# Patient Record
Sex: MALE | Race: WHITE | NOT HISPANIC OR LATINO | Employment: FULL TIME | URBAN - METROPOLITAN AREA
[De-identification: names, ages, dates, MRNs, and addresses within clinical notes are randomized per-mention and may not be internally consistent; named-entity substitution may affect disease eponyms.]

---

## 2017-09-14 ENCOUNTER — OFFICE VISIT (OUTPATIENT)
Dept: MEDICAL GROUP | Facility: MEDICAL CENTER | Age: 27
End: 2017-09-14
Payer: COMMERCIAL

## 2017-09-14 VITALS
DIASTOLIC BLOOD PRESSURE: 64 MMHG | OXYGEN SATURATION: 95 % | WEIGHT: 230.2 LBS | TEMPERATURE: 99.3 F | BODY MASS INDEX: 29.54 KG/M2 | SYSTOLIC BLOOD PRESSURE: 112 MMHG | HEART RATE: 92 BPM | RESPIRATION RATE: 16 BRPM | HEIGHT: 74 IN

## 2017-09-14 DIAGNOSIS — F33.0 MILD EPISODE OF RECURRENT MAJOR DEPRESSIVE DISORDER (HCC): ICD-10-CM

## 2017-09-14 DIAGNOSIS — G47.30 SLEEP APNEA, UNSPECIFIED TYPE: ICD-10-CM

## 2017-09-14 DIAGNOSIS — J06.9 VIRAL UPPER RESPIRATORY TRACT INFECTION: ICD-10-CM

## 2017-09-14 DIAGNOSIS — Z00.00 WELLNESS EXAMINATION: ICD-10-CM

## 2017-09-14 DIAGNOSIS — Z13.6 SCREENING FOR CARDIOVASCULAR CONDITION: ICD-10-CM

## 2017-09-14 PROCEDURE — 99204 OFFICE O/P NEW MOD 45 MIN: CPT | Performed by: PHYSICIAN ASSISTANT

## 2017-09-14 RX ORDER — SERTRALINE HYDROCHLORIDE 25 MG/1
25 TABLET, FILM COATED ORAL DAILY
Qty: 30 TAB | Refills: 3 | Status: SHIPPED | OUTPATIENT
Start: 2017-09-14 | End: 2017-09-29

## 2017-09-14 RX ORDER — IBUPROFEN 200 MG
200 TABLET ORAL EVERY 6 HOURS PRN
COMMUNITY
End: 2018-04-27

## 2017-09-14 RX ORDER — LORATADINE 10 MG/1
10 TABLET ORAL DAILY
COMMUNITY
End: 2018-04-27

## 2017-09-14 ASSESSMENT — PATIENT HEALTH QUESTIONNAIRE - PHQ9
CLINICAL INTERPRETATION OF PHQ2 SCORE: 3
5. POOR APPETITE OR OVEREATING: 2 - MORE THAN HALF THE DAYS
SUM OF ALL RESPONSES TO PHQ QUESTIONS 1-9: 10

## 2017-09-14 NOTE — PATIENT INSTRUCTIONS
Depression, Adult  Depression refers to feeling sad, low, down in the dumps, blue, gloomy, or empty. In general, there are two kinds of depression:  1. Normal sadness or normal grief. This kind of depression is one that we all feel from time to time after upsetting life experiences, such as the loss of a job or the ending of a relationship. This kind of depression is considered normal, is short lived, and resolves within a few days to 2 weeks. Depression experienced after the loss of a loved one (bereavement) often lasts longer than 2 weeks but normally gets better with time.  2. Clinical depression. This kind of depression lasts longer than normal sadness or normal grief or interferes with your ability to function at home, at work, and in school. It also interferes with your personal relationships. It affects almost every aspect of your life. Clinical depression is an illness.  Symptoms of depression can also be caused by conditions other than those mentioned above, such as:  · Physical illness. Some physical illnesses, including underactive thyroid gland (hypothyroidism), severe anemia, specific types of cancer, diabetes, uncontrolled seizures, heart and lung problems, strokes, and chronic pain are commonly associated with symptoms of depression.  · Side effects of some prescription medicine. In some people, certain types of medicine can cause symptoms of depression.  · Substance abuse. Abuse of alcohol and illicit drugs can cause symptoms of depression.  SYMPTOMS  Symptoms of normal sadness and normal grief include the following:  · Feeling sad or crying for short periods of time.  · Not caring about anything (apathy).  · Difficulty sleeping or sleeping too much.  · No longer able to enjoy the things you used to enjoy.  · Desire to be by oneself all the time (social isolation).  · Lack of energy or motivation.  · Difficulty concentrating or remembering.  · Change in appetite or weight.  · Restlessness or  agitation.  Symptoms of clinical depression include the same symptoms of normal sadness or normal grief and also the following symptoms:  · Feeling sad or crying all the time.  · Feelings of guilt or worthlessness.  · Feelings of hopelessness or helplessness.  · Thoughts of suicide or the desire to harm yourself (suicidal ideation).  · Loss of touch with reality (psychotic symptoms). Seeing or hearing things that are not real (hallucinations) or having false beliefs about your life or the people around you (delusions and paranoia).  DIAGNOSIS   The diagnosis of clinical depression is usually based on how bad the symptoms are and how long they have lasted. Your health care provider will also ask you questions about your medical history and substance use to find out if physical illness, use of prescription medicine, or substance abuse is causing your depression. Your health care provider may also order blood tests.  TREATMENT   Often, normal sadness and normal grief do not require treatment. However, sometimes antidepressant medicine is given for bereavement to ease the depressive symptoms until they resolve.  The treatment for clinical depression depends on how bad the symptoms are but often includes antidepressant medicine, counseling with a mental health professional, or both. Your health care provider will help to determine what treatment is best for you.  Depression caused by physical illness usually goes away with appropriate medical treatment of the illness. If prescription medicine is causing depression, talk with your health care provider about stopping the medicine, decreasing the dose, or changing to another medicine.  Depression caused by the abuse of alcohol or illicit drugs goes away when you stop using these substances. Some adults need professional help in order to stop drinking or using drugs.  SEEK IMMEDIATE MEDICAL CARE IF:  · You have thoughts about hurting yourself or others.  · You lose touch  with reality (have psychotic symptoms).  · You are taking medicine for depression and have a serious side effect.  FOR MORE INFORMATION  · National [place] on Mental Illness: www.roberto.org   · National [place] of Mental Health: www.nimh.nih.gov      This information is not intended to replace advice given to you by your health care provider. Make sure you discuss any questions you have with your health care provider.     Document Released: 12/15/2001 Document Revised: 01/08/2016 Document Reviewed: 03/18/2013  [employer] Interactive Patient Education ©2016 [employer].  Sertraline tablets  What is this medicine?  SERTRALINE (SER tra blas) is used to treat depression. It may also be used to treat obsessive compulsive disorder, panic disorder, post-trauma stress, premenstrual dysphoric disorder (PMDD) or social anxiety.  This medicine may be used for other purposes; ask your health care provider or pharmacist if you have questions.  COMMON BRAND NAME(S): Zoloft  What should I tell my health care provider before I take this medicine?  They need to know if you have any of these conditions:  -bipolar disorder or a family history of bipolar disorder  -diabetes  -heart disease  -liver disease  -receiving electroconvulsive therapy  -seizures (convulsions)  -suicidal thoughts, plans, or attempt by you or a family member  -an unusual or allergic reaction to sertraline, other medicines, foods, dyes, or preservatives  -pregnant or trying to get pregnant  -breast-feeding  How should I use this medicine?  Take this medicine by mouth with a glass of water. Follow the directions on the prescription label. You can take it with or without food. Take your medicine at regular intervals. Do not take your medicine more often than directed. Do not stop taking this medicine suddenly except upon the advice of your doctor. Stopping this medicine too quickly may cause serious side effects or your condition may worsen.  A special MedGuide will  be given to you by the pharmacist with each prescription and refill. Be sure to read this information carefully each time.  Talk to your pediatrician regarding the use of this medicine in children. While this drug may be prescribed for children as young as 7 years for selected conditions, precautions do apply.  Overdosage: If you think you have taken too much of this medicine contact a poison control center or emergency room at once.  NOTE: This medicine is only for you. Do not share this medicine with others.  What if I miss a dose?  If you miss a dose, take it as soon as you can. If it is almost time for your next dose, take only that dose. Do not take double or extra doses.  What may interact with this medicine?  Do not take this medicine with any of the following medications:  -linezolid  -MAOIs like Carbex, Eldepryl, Marplan, Nardil, and Parnate  -methylene blue (injected into a vein)  -pimozide  -thioridazine  This medicine may also interact with the following medications:  -alcohol  -aspirin and aspirin-like medicines  -certain medicines for depression, anxiety, or psychotic disturbances  -certain medicines for migraine headaches like almotriptan, eletriptan, frovatriptan, naratriptan, rizatriptan, sumatriptan, zolmitriptan  -certain medicines for seizures like carbamazepine, valproic acid, phenytoin  -cimetidine  -digoxin  -diuretics  -fentanyl  -furazolidone  -isoniazid  -lithium  -medicines that treat or prevent blood clots like warfarin, enoxaparin, and dalteparin  -medicines to control heart rhythm like flecainide or propafenone  -medicines for sleep  -NSAIDs, medicines for pain and inflammation, like ibuprofen or naproxen  -procarbazine  -rasagiline  -supplements like Khadijah's wort, kava kava, valerian  -tolbutamide  -tramadol  -tryptophan  This list may not describe all possible interactions. Give your health care provider a list of all the medicines, herbs, non-prescription drugs, or dietary  supplements you use. Also tell them if you smoke, drink alcohol, or use illegal drugs. Some items may interact with your medicine.  What should I watch for while using this medicine?  Tell your doctor if your symptoms do not get better or if they get worse. Visit your doctor or health care professional for regular checks on your progress. Because it may take several weeks to see the full effects of this medicine, it is important to continue your treatment as prescribed by your doctor.  Patients and their families should watch out for new or worsening thoughts of suicide or depression. Also watch out for sudden changes in feelings such as feeling anxious, agitated, panicky, irritable, hostile, aggressive, impulsive, severely restless, overly excited and hyperactive, or not being able to sleep. If this happens, especially at the beginning of treatment or after a change in dose, call your health care professional.  You may get drowsy or dizzy. Do not drive, use machinery, or do anything that needs mental alertness until you know how this medicine affects you. Do not stand or sit up quickly, especially if you are an older patient. This reduces the risk of dizzy or fainting spells. Alcohol may interfere with the effect of this medicine. Avoid alcoholic drinks.  Your mouth may get dry. Chewing sugarless gum or sucking hard candy, and drinking plenty of water may help. Contact your doctor if the problem does not go away or is severe.  What side effects may I notice from receiving this medicine?  Side effects that you should report to your doctor or health care professional as soon as possible:  -allergic reactions like skin rash, itching or hives, swelling of the face, lips, or tongue  -black or bloody stools, blood in the urine or vomit  -fast, irregular heartbeat  -feeling faint or lightheaded, falls  -hallucination, loss of contact with reality  -seizures  -suicidal thoughts or other mood changes  -unusual bleeding or  bruising  -unusually weak or tired  -vomiting  Side effects that usually do not require medical attention (report to your doctor or health care professional if they continue or are bothersome):  -change in appetite  -change in sex drive or performance  -diarrhea  -increased sweating  -indigestion, nausea  -tremors  This list may not describe all possible side effects. Call your doctor for medical advice about side effects. You may report side effects to FDA at 4-769-TOH-6017.  Where should I keep my medicine?  Keep out of the reach of children.  Store at room temperature between 15 and 30 degrees C (59 and 86 degrees F). Throw away any unused medicine after the expiration date.  NOTE: This sheet is a summary. It may not cover all possible information. If you have questions about this medicine, talk to your doctor, pharmacist, or health care provider.  © 2014, Elsevier/[employer]. (5/3/2013 8:59:59 PM)

## 2017-09-14 NOTE — ASSESSMENT & PLAN NOTE
Feels like he has been depressed for the past 5 years. Notices feelings of being down, depressed, anxious, not happy with work. Interested in trying medication.

## 2017-09-14 NOTE — LETTER
September 14, 2017        Rene Henry    To Whom it May Concern:  Patient, Rene, was seen in our clinic today to discuss multiple medical matters. Please excuse from work today so he may take and adjust to medication.     If you have any questions, feel free to reach out.  [phone]                        Cris Velarde PA-C  Electronically signed

## 2017-09-14 NOTE — ASSESSMENT & PLAN NOTE
"Started about 2 weeks ago. Woke up with fever/chills. Went to work, cough, sore throat fever. Went home and rested. 3rd day went to urgent care. Fever was better. They did exam and gave rx for antibiotic, cough medication but he didn't take them. Since then still coughing, brown specks in phlegm. When this happened previously he though it was from smoking but now not smoking anymore so not sure why the brown mucus is coming. Getting intermittent left frontal headaches. Feeling congestion in nose. Having some sinus pain above eyes.getting some chest pain, not necessarily associated with cough. Also now having \"pastel\" colored stools every few bowel movements. 3-4 weeks ago took a vitamin D supplement. Recently over the past month trying to eat healthier.  "

## 2017-09-14 NOTE — PROGRESS NOTES
"Chief Complaint   Patient presents with   • Establish Care   • Cold Symptoms     x 2 weeks, brown phlegm, headache, sore throat, fever, stomach ache/diahrrea, congestion   • Chest Pain     tight pain in middle of chest, random       HISTORY OF THE PRESENT ILLNESS: This is a 26 y.o. male new patient to our practice. This pleasant patient is here today to discuss recent upper respiratory symptoms, depression, sleep apnea    Viral upper respiratory tract infection  Started about 2 weeks ago. Woke up with fever/chills. Went to work, cough, sore throat fever. Went home and rested. 3rd day went to urgent care. Fever was better. They did exam and gave rx for antibiotic, cough medication but he didn't take them. Since then still coughing, brown specks in phlegm. When this happened previously he though it was from smoking but now not smoking anymore so not sure why the brown mucus is coming. Getting intermittent left frontal headaches. Feeling congestion in nose. Having some sinus pain above eyes.getting some chest pain, not necessarily associated with cough. Also now having \"pastel\" colored stools every few bowel movements. 3-4 weeks ago took a vitamin D supplement. Recently over the past month trying to eat healthier.    Mild episode of recurrent major depressive disorder (CMS-HCC)  Feels like he has been depressed for the past 5 years. Notices feelings of being down, depressed, anxious, not happy with work. Interested in trying medication.     Sleep apnea  For the past 5 years wife has noticed he stops breathing at night for a few seconds then will gasp and then start breathing normally again. Dad has sleep apnea. Patient notices he gets very tired daily starting about 11am. Would like to be checked for sleep apnea    Depression Screening    Little interest or pleasure in doing things?  1 - several days  Feeling down, depressed , or hopeless? 2 - more than half the days  Trouble falling or staying asleep, or sleeping too " much?  2 - more than half the days  Feeling tired or having little energy?  2 - more than half the days  Poor appetite or overeating?  2 - more than half the days  Feeling bad about yourself - or that you are a failure or have let yourself or your family down? 1 - several days  Trouble concentrating on things, such as reading the newspaper or watching television? 0 - not at all  Moving or speaking so slowly that other people could have noticed.  Or the opposite - being so fidgety or restless that you have been moving around a lot more than usual?  0 - not at all  Thoughts that you would be better off dead, or of hurting yourself?  0 - not at all  Patient Health Questionnaire Score: 10      If depressive symptoms identified deferred to follow up visit unless specifically addressed in assesment and plan.    Interpretation of PHQ-9 Total Score   Score Severity   1-4 No Depression   5-9 Mild Depression   10-14 Moderate Depression   15-19 Moderately Severe Depression   20-27 Severe Depression  Past Medical History:   Diagnosis Date   • Acne        No past surgical history on file.    Family Status   Relation Status   • Mother Alive   • Father Alive     Family History   Problem Relation Age of Onset   • Blood Clots Mother    • Hyperlipidemia Father    • Diabetes Father        Social History   Substance Use Topics   • Smoking status: Former Smoker     Types: Cigarettes     Quit date: 8/24/2017   • Smokeless tobacco: Never Used      Comment: 1-2 per week/quit recently   • Alcohol use 1.2 oz/week     2 Cans of beer per week       Allergies: Review of patient's allergies indicates no known allergies.    Current Outpatient Prescriptions Ordered in [facility]   Medication Sig Dispense Refill   • ibuprofen (MOTRIN) 200 MG Tab Take 200 mg by mouth every 6 hours as needed.     • loratadine (CLARITIN) 10 MG Tab Take 10 mg by mouth every day.     • sertraline (ZOLOFT) 25 MG tablet Take 1 Tab by mouth every day. 30 Tab 3     No current  "Epic-ordered facility-administered medications on file.        Review of Systems   Constitutional: Negative for fever, chills, weight loss      Eyes: Negative for blurred vision.   Respiratory: Negative for  shortness of breath and wheezing.    Cardiovascular: Negative for  palpitations, orthopnea and leg swelling.   Gastrointestinal: Negative for heartburn, nausea, vomiting and abdominal pain.   Genitourinary: Negative for dysuria, urgency and frequency.   Musculoskeletal: Negative for myalgias, back pain and joint pain.   Skin: Negative for rash and itching.   Neurological: Negative for dizziness, tingling, tremors, sensory change, focal weakness  Endo/Heme/Allergies: Does not bruise/bleed easily.   All other systems reviewed and are negative except as in HPI.    Exam: Blood pressure 112/64, pulse 92, temperature 37.4 °C (99.3 °F), resp. rate 16, height 1.88 m (6' 2\"), weight 104.4 kg (230 lb 3.2 oz), SpO2 95 %.  General: Normal appearing. No distress.  HEENT: Normocephalic. Eyes conjunctiva clear lids without ptosis, pupils equal and reactive to light accommodation, ears normal shape and contour, canals are clear bilaterally, tympanic membranes are benign, nasal mucosa benign, oropharynx is without erythema, edema or exudates.   Neck: Supple without JVD or bruit. Thyroid is not enlarged.  Pulmonary: Clear to ausculation.  Normal effort. No rales, ronchi, or wheezing.  Cardiovascular: Regular rate and rhythm without murmur. Carotid and radial pulses are intact and equal bilaterally.  Neurologic: Grossly nonfocal  Lymph: No cervical, supraclavicular  lymph nodes are palpable  Skin: Warm and dry.  No obvious lesions.  Musculoskeletal: Normal gait. No extremity cyanosis, clubbing, or edema.  Psych: Normal mood and affect. Alert and oriented x3. Judgment and insight is normal.      Assessment/Plan  1. Viral upper respiratory tract infection     2. Sleep apnea, unspecified type  REFERRAL TO SLEEP STUDIES   3. Mild " episode of recurrent major depressive disorder (CMS-HCC)  sertraline (ZOLOFT) 25 MG tablet    Patient has been identified as being depressed and appropriate orders and counseling have been given   4. Wellness examination  CBC WITH DIFFERENTIAL    COMP METABOLIC PANEL    TSH WITH REFLEX TO FT4   5. Screening for cardiovascular condition  LIPID PROFILE     1. Viral URI. Discussed smoking cessation, supportive and otc meds. If symptoms persist consider CXR  2. Possible sleep apnea, referred to sleep studies  3. Depression. Discussed risk/benefit and side effect profile for zoloft. F/u 2 weeks  Labs ordered  Will get flu shot at f/u in 2 weeks

## 2017-09-24 ENCOUNTER — PATIENT MESSAGE (OUTPATIENT)
Dept: MEDICAL GROUP | Facility: MEDICAL CENTER | Age: 27
End: 2017-09-24

## 2017-09-24 DIAGNOSIS — R05.9 COUGH: ICD-10-CM

## 2017-09-26 ENCOUNTER — HOSPITAL ENCOUNTER (OUTPATIENT)
Dept: LAB | Facility: MEDICAL CENTER | Age: 27
End: 2017-09-26
Attending: PHYSICIAN ASSISTANT
Payer: COMMERCIAL

## 2017-09-26 DIAGNOSIS — Z00.00 WELLNESS EXAMINATION: ICD-10-CM

## 2017-09-26 DIAGNOSIS — Z13.6 SCREENING FOR CARDIOVASCULAR CONDITION: ICD-10-CM

## 2017-09-26 LAB
ALBUMIN SERPL BCP-MCNC: 4.4 G/DL (ref 3.2–4.9)
ALBUMIN/GLOB SERPL: 1.8 G/DL
ALP SERPL-CCNC: 65 U/L (ref 30–99)
ALT SERPL-CCNC: 28 U/L (ref 2–50)
ANION GAP SERPL CALC-SCNC: 6 MMOL/L (ref 0–11.9)
AST SERPL-CCNC: 22 U/L (ref 12–45)
BASOPHILS # BLD AUTO: 0.8 % (ref 0–1.8)
BASOPHILS # BLD: 0.06 K/UL (ref 0–0.12)
BILIRUB SERPL-MCNC: 0.4 MG/DL (ref 0.1–1.5)
BUN SERPL-MCNC: 12 MG/DL (ref 8–22)
CALCIUM SERPL-MCNC: 9.7 MG/DL (ref 8.5–10.5)
CHLORIDE SERPL-SCNC: 105 MMOL/L (ref 96–112)
CHOLEST SERPL-MCNC: 178 MG/DL (ref 100–199)
CO2 SERPL-SCNC: 28 MMOL/L (ref 20–33)
CREAT SERPL-MCNC: 0.85 MG/DL (ref 0.5–1.4)
EOSINOPHIL # BLD AUTO: 0.04 K/UL (ref 0–0.51)
EOSINOPHIL NFR BLD: 0.5 % (ref 0–6.9)
ERYTHROCYTE [DISTWIDTH] IN BLOOD BY AUTOMATED COUNT: 41.1 FL (ref 35.9–50)
GFR SERPL CREATININE-BSD FRML MDRD: >60 ML/MIN/1.73 M 2
GLOBULIN SER CALC-MCNC: 2.5 G/DL (ref 1.9–3.5)
GLUCOSE SERPL-MCNC: 88 MG/DL (ref 65–99)
HCT VFR BLD AUTO: 43.5 % (ref 42–52)
HDLC SERPL-MCNC: 40 MG/DL
HGB BLD-MCNC: 14.7 G/DL (ref 14–18)
IMM GRANULOCYTES # BLD AUTO: 0.01 K/UL (ref 0–0.11)
IMM GRANULOCYTES NFR BLD AUTO: 0.1 % (ref 0–0.9)
LDLC SERPL CALC-MCNC: 108 MG/DL
LYMPHOCYTES # BLD AUTO: 2.6 K/UL (ref 1–4.8)
LYMPHOCYTES NFR BLD: 34.5 % (ref 22–41)
MCH RBC QN AUTO: 31.7 PG (ref 27–33)
MCHC RBC AUTO-ENTMCNC: 33.8 G/DL (ref 33.7–35.3)
MCV RBC AUTO: 94 FL (ref 81.4–97.8)
MONOCYTES # BLD AUTO: 0.4 K/UL (ref 0–0.85)
MONOCYTES NFR BLD AUTO: 5.3 % (ref 0–13.4)
NEUTROPHILS # BLD AUTO: 4.42 K/UL (ref 1.82–7.42)
NEUTROPHILS NFR BLD: 58.8 % (ref 44–72)
NRBC # BLD AUTO: 0 K/UL
NRBC BLD AUTO-RTO: 0 /100 WBC
PLATELET # BLD AUTO: 189 K/UL (ref 164–446)
PMV BLD AUTO: 10.9 FL (ref 9–12.9)
POTASSIUM SERPL-SCNC: 3.9 MMOL/L (ref 3.6–5.5)
PROT SERPL-MCNC: 6.9 G/DL (ref 6–8.2)
RBC # BLD AUTO: 4.63 M/UL (ref 4.7–6.1)
SODIUM SERPL-SCNC: 139 MMOL/L (ref 135–145)
TRIGL SERPL-MCNC: 150 MG/DL (ref 0–149)
TSH SERPL DL<=0.005 MIU/L-ACNC: 3.42 UIU/ML (ref 0.3–3.7)
WBC # BLD AUTO: 7.5 K/UL (ref 4.8–10.8)

## 2017-09-26 PROCEDURE — 80053 COMPREHEN METABOLIC PANEL: CPT

## 2017-09-26 PROCEDURE — 84443 ASSAY THYROID STIM HORMONE: CPT

## 2017-09-26 PROCEDURE — 80061 LIPID PANEL: CPT

## 2017-09-26 PROCEDURE — 36415 COLL VENOUS BLD VENIPUNCTURE: CPT

## 2017-09-26 PROCEDURE — 85025 COMPLETE CBC W/AUTO DIFF WBC: CPT

## 2017-09-27 ENCOUNTER — HOSPITAL ENCOUNTER (OUTPATIENT)
Dept: RADIOLOGY | Facility: MEDICAL CENTER | Age: 27
End: 2017-09-27
Attending: PHYSICIAN ASSISTANT
Payer: COMMERCIAL

## 2017-09-27 DIAGNOSIS — R05.9 COUGH: ICD-10-CM

## 2017-09-27 PROCEDURE — 71020 DX-CHEST-2 VIEWS: CPT

## 2017-09-29 ENCOUNTER — OFFICE VISIT (OUTPATIENT)
Dept: MEDICAL GROUP | Facility: MEDICAL CENTER | Age: 27
End: 2017-09-29
Payer: COMMERCIAL

## 2017-09-29 VITALS
RESPIRATION RATE: 16 BRPM | TEMPERATURE: 98.6 F | WEIGHT: 229.2 LBS | SYSTOLIC BLOOD PRESSURE: 132 MMHG | HEART RATE: 78 BPM | DIASTOLIC BLOOD PRESSURE: 82 MMHG | OXYGEN SATURATION: 96 % | BODY MASS INDEX: 29.41 KG/M2 | HEIGHT: 74 IN

## 2017-09-29 DIAGNOSIS — F33.0 MILD EPISODE OF RECURRENT MAJOR DEPRESSIVE DISORDER (HCC): ICD-10-CM

## 2017-09-29 DIAGNOSIS — M54.6 THORACIC SPINE PAIN: ICD-10-CM

## 2017-09-29 DIAGNOSIS — J06.9 VIRAL UPPER RESPIRATORY TRACT INFECTION: ICD-10-CM

## 2017-09-29 PROCEDURE — 99214 OFFICE O/P EST MOD 30 MIN: CPT | Performed by: PHYSICIAN ASSISTANT

## 2017-10-02 PROBLEM — M54.6 THORACIC SPINE PAIN: Status: ACTIVE | Noted: 2017-10-02

## 2017-10-02 PROBLEM — J06.9 VIRAL UPPER RESPIRATORY TRACT INFECTION: Status: RESOLVED | Noted: 2017-09-14 | Resolved: 2017-10-02

## 2017-10-03 NOTE — PROGRESS NOTES
"Subjective:   Rene Figueroa is a 27 y.o. male here today for depression, back/spine/chest pain    Thoracic spine pain  New problem. Started around the same time as his recent cold/cough. Cough is improving. Was having some chest pain. CXR normal. CBC normal. No fever. No hemoptysis. Still with some upper back discomfort.    Mild episode of recurrent major depressive disorder (CMS-HCC)  Slight improvement in symptoms, no adverse reaction to zoloft. Willing to increase dose.     CXR, CBC, CMP, TSH reviewed in office with patient    Current medicines (including changes today)  Current Outpatient Prescriptions   Medication Sig Dispense Refill   • sertraline (ZOLOFT) 50 MG Tab Take 1 Tab by mouth every day. 30 Tab 3   • ibuprofen (MOTRIN) 200 MG Tab Take 200 mg by mouth every 6 hours as needed.     • loratadine (CLARITIN) 10 MG Tab Take 10 mg by mouth every day.       No current facility-administered medications for this visit.      He  has a past medical history of Acne.    ROS   No fever/chills. No weight change. No headache/dizziness. No focal weakness. No sore throat, nasal congestion, ear pain. no shortness of breath, difficulty breathing. No n/v/d/c or abdominal pain. No urinary complaint. No rash or skin lesion. No joint pain or swelling.       Objective:     Blood pressure 132/82, pulse 78, temperature 37 °C (98.6 °F), resp. rate 16, height 1.88 m (6' 2\"), weight 104 kg (229 lb 3.2 oz), SpO2 96 %. Body mass index is 29.43 kg/m².   Physical Exam:  Constitutional: WDWN, NAD  Skin: Warm, dry, good turgor, no rashes in visible areas.  Eye: Equal, round and reactive, conjunctiva clear, lids normal.  ENMT: Lips without lesions, good dentition, oropharynx clear.  Neck: Trachea midline, no masses, no thyromegaly. No cervical or supraclavicular lymphadenopathy  Respiratory: Unlabored respiratory effort, lungs clear to auscultation, no wheezes, no ronchi.  Cardiovascular: Normal S1, S2, no murmur, no leg edema.  Thoracic " spine normal to inspection without deformity or tenderness. Paraspinal muscles with spasm, slightly ttp  Psych: Alert and oriented x3, normal affect and mood.        Assessment and Plan:   The following treatment plan was discussed    1. Mild episode of recurrent major depressive disorder (CMS-HCC)  Increase from 25 to 50mg daily  - sertraline (ZOLOFT) 50 MG Tab; Take 1 Tab by mouth every day.  Dispense: 30 Tab; Refill: 3    2. Viral upper respiratory tract infection  Resolving without antibiotic    3. Thoracic spine pain  Likely muscular, PT rec  - DX-THORACIC SPINE-2 VIEWS; Future  - REFERRAL TO PHYSICAL THERAPY Reason for Therapy: Eval/Treat/Report      Followup: Return in about 4 weeks (around 10/27/2017).

## 2017-10-03 NOTE — ASSESSMENT & PLAN NOTE
New problem. Started around the same time as his recent cold/cough. Cough is improving. Was having some chest pain. CXR normal. CBC normal. No fever. No hemoptysis. Still with some upper back discomfort.

## 2017-10-10 ENCOUNTER — HOSPITAL ENCOUNTER (OUTPATIENT)
Dept: RADIOLOGY | Facility: MEDICAL CENTER | Age: 27
End: 2017-10-10
Attending: PHYSICIAN ASSISTANT
Payer: COMMERCIAL

## 2017-10-10 DIAGNOSIS — M54.6 THORACIC SPINE PAIN: ICD-10-CM

## 2017-10-10 PROCEDURE — 72070 X-RAY EXAM THORAC SPINE 2VWS: CPT

## 2017-10-13 ENCOUNTER — PATIENT MESSAGE (OUTPATIENT)
Dept: MEDICAL GROUP | Facility: MEDICAL CENTER | Age: 27
End: 2017-10-13

## 2017-10-13 DIAGNOSIS — M54.6 THORACIC SPINE PAIN: ICD-10-CM

## 2017-10-13 DIAGNOSIS — R19.7 DIARRHEA, UNSPECIFIED TYPE: ICD-10-CM

## 2017-11-01 ENCOUNTER — OFFICE VISIT (OUTPATIENT)
Dept: PHYSICAL MEDICINE AND REHAB | Facility: MEDICAL CENTER | Age: 27
End: 2017-11-01
Payer: COMMERCIAL

## 2017-11-01 ENCOUNTER — OFFICE VISIT (OUTPATIENT)
Dept: MEDICAL GROUP | Facility: MEDICAL CENTER | Age: 27
End: 2017-11-01
Payer: COMMERCIAL

## 2017-11-01 VITALS
RESPIRATION RATE: 12 BRPM | HEIGHT: 74 IN | WEIGHT: 225 LBS | BODY MASS INDEX: 28.88 KG/M2 | OXYGEN SATURATION: 96 % | TEMPERATURE: 98.4 F | SYSTOLIC BLOOD PRESSURE: 112 MMHG | HEART RATE: 84 BPM | DIASTOLIC BLOOD PRESSURE: 64 MMHG

## 2017-11-01 VITALS
OXYGEN SATURATION: 94 % | DIASTOLIC BLOOD PRESSURE: 70 MMHG | WEIGHT: 227.8 LBS | RESPIRATION RATE: 16 BRPM | SYSTOLIC BLOOD PRESSURE: 110 MMHG | HEIGHT: 74 IN | HEART RATE: 76 BPM | TEMPERATURE: 98.6 F | BODY MASS INDEX: 29.24 KG/M2

## 2017-11-01 DIAGNOSIS — L20.82 FLEXURAL ECZEMA: ICD-10-CM

## 2017-11-01 DIAGNOSIS — M89.8X1 CHRONIC SCAPULAR PAIN: ICD-10-CM

## 2017-11-01 DIAGNOSIS — L30.9 ECZEMA, UNSPECIFIED TYPE: ICD-10-CM

## 2017-11-01 DIAGNOSIS — F33.0 MILD EPISODE OF RECURRENT MAJOR DEPRESSIVE DISORDER (HCC): ICD-10-CM

## 2017-11-01 DIAGNOSIS — G89.29 CHRONIC SCAPULAR PAIN: ICD-10-CM

## 2017-11-01 DIAGNOSIS — M54.9 UPPER BACK PAIN: ICD-10-CM

## 2017-11-01 DIAGNOSIS — M54.2 NECK PAIN: ICD-10-CM

## 2017-11-01 DIAGNOSIS — M54.6 ACUTE BILATERAL THORACIC BACK PAIN: ICD-10-CM

## 2017-11-01 DIAGNOSIS — K59.1 FUNCTIONAL DIARRHEA: ICD-10-CM

## 2017-11-01 DIAGNOSIS — Z23 NEED FOR INFLUENZA VACCINATION: ICD-10-CM

## 2017-11-01 DIAGNOSIS — R29.3 POSTURE ABNORMALITY: ICD-10-CM

## 2017-11-01 DIAGNOSIS — M79.10 MYALGIA: ICD-10-CM

## 2017-11-01 PROCEDURE — 99214 OFFICE O/P EST MOD 30 MIN: CPT | Mod: 25 | Performed by: PHYSICIAN ASSISTANT

## 2017-11-01 PROCEDURE — 20553 NJX 1/MLT TRIGGER POINTS 3/>: CPT | Performed by: PHYSICAL MEDICINE & REHABILITATION

## 2017-11-01 PROCEDURE — 90471 IMMUNIZATION ADMIN: CPT | Performed by: PHYSICIAN ASSISTANT

## 2017-11-01 PROCEDURE — 90686 IIV4 VACC NO PRSV 0.5 ML IM: CPT | Performed by: PHYSICIAN ASSISTANT

## 2017-11-01 PROCEDURE — 99204 OFFICE O/P NEW MOD 45 MIN: CPT | Mod: 25 | Performed by: PHYSICAL MEDICINE & REHABILITATION

## 2017-11-01 RX ORDER — TRIAMCINOLONE ACETONIDE 1 MG/G
1 OINTMENT TOPICAL 2 TIMES DAILY
Qty: 1 TUBE | Refills: 3 | Status: SHIPPED | OUTPATIENT
Start: 2017-11-01 | End: 2017-11-06

## 2017-11-01 RX ORDER — SERTRALINE HYDROCHLORIDE 100 MG/1
100 TABLET, FILM COATED ORAL DAILY
Qty: 30 TAB | Refills: 6 | Status: SHIPPED | OUTPATIENT
Start: 2017-11-01 | End: 2018-04-27

## 2017-11-01 ASSESSMENT — PAIN SCALES - GENERAL: PAINLEVEL: 3=SLIGHT PAIN

## 2017-11-01 NOTE — LETTER
November 1, 2017        Patient: Rene Lopez Henry                Please have the patient's work station evaluated for an ergonomic assessment.               Michael Sauceda MD  11/1/2017  11:50 AM

## 2017-11-01 NOTE — PROGRESS NOTES
Date of Service: 11/1/2017    Physician/s: Michael Sauceda MD    Pre-operative Diagnosis: Myalgia (M79.1)    Post-operative Diagnosis: Myalgia (M79.1)    Procedure: right and left trapezius, rhomboids, latissimus dorsi trigger point injections    Description of procedure:    The risks, benefits, and alternatives of the procedure were reviewed and discussed with the patient.  Written informed consent was freely obtained. A pre-procedural time-out was conducted by the physician verifying patient’s identity, procedure to be performed, procedure site and side, and allergy verification. Appropriate equipment was determined to be in place for the procedure.     In the office suite exam room the patient was placed in a prone position and the skin areas for injection over the right and left trapezius, rhomboids, latissimus dorsi. The areas of pain was then prepped and draped in the usual sterile fashion. A 25g needle was placed into each of the markings at the areas above.. After negative aspiration, approximately a total of 5cc's of 1% lidocaine Marcaine mixed with 5mL of sterile saline was injected into the areas above, whereby 2cc's of the above mixture was injected at each site. The needle was removed intact after each trigger point injection, and the patient's back was covered with a 4x4 gauze, the area was cleansed with sterile normal saline, and a dressing was applied. There were no complications noted.     Preprocedural pain: 5/10  Postprocedural pain: 0/10    Mcihael Sauceda MD    Physical Medicine and Rehabilitation  Interventional Spine and Sports Physiatry  [facility]  11/1/2017  12:08 PM

## 2017-11-01 NOTE — PROGRESS NOTES
New patient note    Physiatry (physical medicine and  Rehabilitation), interventional spine and sports medicine, Pain medicine    Date of Service: 11/1/2017    Chief complaint: neck and throacic pain    HISTORY    HPI: Rene Figueroa 27 y.o. male who presents today with chronic neck pain, chronic periscapular pain, chronic latissimus pain. All of these pains are 5/10 in intensity, chronic, worsened over the past 2 months. Pain is aching in quality, worse with sitting, worse with sitting at a computer, worse working with his hands out in front of him. The patient works in IT and fixes computers. Pain is better when standing, relaxing, laying down. He is even had to miss a few days of work because of the pain. There is no radiation of pain. Denies weakness. Denies injuries.     The patient has remote history of a right acromion fracture when playing football in high school. That is not bothering him at this time.     The patient does pushups and sit-ups for his workouts but he states that he does not do any workouts or lifting for his back muscles.    Medical records review:  I reviewed the note from the referring provider Cris Velarde P.A.* dated 9/29/2017 and 9/14/2017.  Thoracic spine pain which started around the same time as an upper respiratory infection. Primary E Syd there were no signs of an acute infection. Patient still with upper back pain. Prescribed physical therapy. Ordered an x-ray of the thoracic spine.    Previous treatments:    Physical Therapy: Ordered by PCP    Medications the patient is tried: Ibuprofen, Tylenol    Previous interventions: none    Previous surgeries to relieve the above pain:  none      ROS:   Red Flags ROS:   Fever, Chills, Sweats: Denies  Involuntary Weight Loss: Denies  Bladder Incontinence: Denies  Bowel Incontinence: denies  Saddle Anesthesia: Denies    All other systems reviewed and negative.       PMHx:   Past Medical History:   Diagnosis Date   • Acne   "      PSHx:   No past surgical history on file.    Family history   Family History   Problem Relation Age of Onset   • Blood Clots Mother    • Hyperlipidemia Father    • Diabetes Father          Medications:   Current Outpatient Prescriptions   Medication   • sertraline (ZOLOFT) 50 MG Tab   • ibuprofen (MOTRIN) 200 MG Tab   • loratadine (CLARITIN) 10 MG Tab     No current facility-administered medications for this visit.        Allergies:   No Known Allergies    Social Hx:   Social History     Social History   • Marital status:      Spouse name: N/A   • Number of children: N/A   • Years of education: N/A     Occupational History   • Not on file.     Social History Main Topics   • Smoking status: Former Smoker     Types: Cigarettes     Quit date: 8/24/2017   • Smokeless tobacco: Never Used      Comment: 1-2 per week/quit recently   • Alcohol use 1.2 oz/week     2 Cans of beer per week   • Drug use:      Types: Marijuana   • Sexual activity: Yes     Partners: Female     Other Topics Concern   •  Service No   • Blood Transfusions No   • Caffeine Concern Yes   • Occupational Exposure No   • Hobby Hazards No   • Sleep Concern No   • Stress Concern No   • Weight Concern No   • Special Diet No   • Back Care Yes   • Exercise Yes   • Bike Helmet Yes   • Seat Belt Yes   • Self-Exams No     Social History Narrative   • No narrative on file         EXAMINATION     Physical Exam:   Vitals: Blood pressure 112/64, pulse 84, temperature 36.9 °C (98.4 °F), resp. rate 12, height 1.88 m (6' 2\"), weight 102.1 kg (225 lb), SpO2 96 %.    Constitutional:   Body Habitus: Body mass index is 28.89 kg/m².  Cooperation: Fully cooperates with exam  Appearance: Well-groomed, well-nourished, not disheveled     Eyes: No scleral icterus to suggest severe liver disease, no proptosis to suggest severe hyperthyroid    ENT -no obvious auditory deficits, no obvious tongue lesions, tongue midline, no facial droop     Skin -no rashes or " lesions noted     Respiratory-  breathing comfortable on room air, no audible wheezing    Cardiovascular- capillary refills less than 2 seconds. No lower extremity edema is noted.     Gastrointestinal - no obvious abdominal masses, No tenderness to palpation in the abdomen    Psychiatric- alert and oriented ×3. Normal affect.     Gait - normal gait, no use of ambulatory device, nonantalgic patient is able to heel walk, toe walk, tandem walk with ease..     Musculoskeletal -   Cervical spine   Inspection: No deformities of the skin over the cervical spine. No rashes or lesions.    full  A/P ROM in all directions, with  without pain      Spurling’s sign: negative bilaterally    No signs of muscular atrophy in bilateral upper extremities       Thoracic/Lumbar Spine/Sacral Spine/Hips   Inspection: No evidence of atrophy in bilateral lower extremities throughout     ROM: full  AROM with flexion, extension, lateral flexion, and rotation bilaterally, with without pain     Palpation: No tenderness to palpation in midline at T1-L5 levels   No tenderness to palpation in the left and right of the midline at T1-L5 levels   palpation over SI joint: negative bilaterally    palpation over buttock: negative bilaterally    palpation in hip or over the greater trochanters: negative bilaterally      Lumbar spine Special tests  Neuro tension  Straight leg test negative bilaterally    Slump test negative bilaterally        Neuro     Sensory exam to the upper extremities is grossly intact at the [place] points from C5-T2     Sensory exam of the lower extremities is grossly intact at [place] points from L2-S2     Motor Exam Upper Extremities   ? Myotome R L   Shoulder flexion C5 5 5   Elbow flexion C5 5 5   Wrist extension C6 5 5   Elbow extension C7 5 5   Finger flexion C8 5 5   Finger abduction T1 5 5         Motor Exam Lower Extremities    ? Myotome R L   Hip flexion L2 5 5   Knee extension L3 5 5   Ankle dorsiflexion L4 5 5   Toe  extension L5 5 5   Ankle plantarflexion S1 5 5           Tone on Modified Luis Scale    R L  R L   Shoulder Adduction Negative  Negative  Hip Flexion Negative  Negative    Elbow Flexion Negative  Negative  Hip Extension Negative  Negative    Elbow Extension Negative  Negative  Hip Adduction Negative  Negative    Wrist Flexion Negative  Negative  Knee Extension Negative  Negative    Finger Flexion Negative  Negative  Knee Flexion Negative  Negative       Dorsiflexion Negative  Negative       Plantar Flexion Negative  Negative      Guerra’s sign negative bilaterally     reflexes 2+ in the bilateral biceps and brachioradialis      MEDICAL DECISION MAKING    DATA    Labs:   Lab Results   Component Value Date/Time    SODIUM 139 09/26/2017 06:23 AM    POTASSIUM 3.9 09/26/2017 06:23 AM    CHLORIDE 105 09/26/2017 06:23 AM    CO2 28 09/26/2017 06:23 AM    GLUCOSE 88 09/26/2017 06:23 AM    BUN 12 09/26/2017 06:23 AM    CREATININE 0.85 09/26/2017 06:23 AM        No results found for: PROTHROMBTM, INR     Lab Results   Component Value Date/Time    WBC 7.5 09/26/2017 06:23 AM    RBC 4.63 (L) 09/26/2017 06:23 AM    HEMOGLOBIN 14.7 09/26/2017 06:23 AM    HEMATOCRIT 43.5 09/26/2017 06:23 AM    MCV 94.0 09/26/2017 06:23 AM    MCH 31.7 09/26/2017 06:23 AM    MCHC 33.8 09/26/2017 06:23 AM    MPV 10.9 09/26/2017 06:23 AM    NEUTSPOLYS 58.80 09/26/2017 06:23 AM    LYMPHOCYTES 34.50 09/26/2017 06:23 AM    MONOCYTES 5.30 09/26/2017 06:23 AM    EOSINOPHILS 0.50 09/26/2017 06:23 AM    BASOPHILS 0.80 09/26/2017 06:23 AM      Labs reviewed and unremarkable. No signs of an acute bacterial infection at the time.      Imaging: I personally reviewed following images, these are my reads  Thoracic spine x-ray 10/10/2017  Essentially normal radiograph. No evidence of degenerative disc disease, fractures, acute findings.    Chest x-ray 9/27/2017  Essentially normal chest x-ray. No signs of pneumonia.        Diagnosis   Diagnoses of Myalgia,  Acute bilateral thoracic back pain, Chronic scapular pain, Neck pain, and upper cross, forward neck and shoulder posture were pertinent to this visit.        ASSESSMENT:  Rene Lopez Henry 27 y.o. male      Diagnoses and all orders for this visit:    Myalgia - worsening. Given that the patient had missed work and that the pain is worsening I decided to do trigger point injections on the same day. See separate procedure note.        Acute bilateral thoracic back pain   Chronic scapular pain  Neck pain  upper cross, forward neck and shoulder posture -pain is worsening and likely secondary to muscle imbalance from lifting his anterior pushing muscles but not exercising the back muscles including the rhomboids and latissimus dorsi. Poor posture is also contributing to the patient's pain. There were no red flags on exam. We discussed proper sitting mechanics and proper posture positioning when the patient is at work. I also showed the patient proper lifting technique for rows and lat pulls. I agree with the patient going to physical therapy and I advised the patient to focus on a strengthening and stretching program to teach him a home exercise program.          Follow-up: 1 month          Please note that this dictation was created using voice recognition software. I have made every reasonable attempt to correct obvious errors but there may be errors of grammar and content that I may have overlooked prior to finalization of this note.      Michael Sauceda MD    Physical Medicine and Rehabilitation  Interventional Spine and Sports Physiatry  [facility]  11/1/2017  11:01 AM             Cris Gunter P.A.* .

## 2017-11-01 NOTE — Clinical Note
Thank you for the referral. I believe that Rene's symptoms are likely secondary to muscle imbalance from his lifting patterns as well as poor posture. I prescribed an ergonomic assessment for his desk. I performed trigger point injections today and he had a great response. I agree with physical therapy. I will continue to follow up with Rene.

## 2017-11-02 PROBLEM — K59.1 FUNCTIONAL DIARRHEA: Status: ACTIVE | Noted: 2017-11-02

## 2017-11-02 PROBLEM — L20.82 FLEXURAL ECZEMA: Status: ACTIVE | Noted: 2017-11-02

## 2017-11-02 PROBLEM — M54.9 UPPER BACK PAIN: Status: ACTIVE | Noted: 2017-10-02

## 2017-11-02 NOTE — PROGRESS NOTES
"Subjective:   Rene Figueroa is a 27 y.o. male here today for depression, back pain and diarrhea follow up    Mild episode of recurrent major depressive disorder (CMS-[facility])  Noticing improvement from before. More \"normal\" than he was before. Better energy. Fewer negative \"outbreaks\". No side effects. Would like to increase dose.    Upper back pain  appt with physiatry today. Trigger point injections. Encouraged to do strengthening and stretching. Encouraged to look at ergonomics at work.     Functional diarrhea  Off and on. Sometimes with pale or yellow stool. Seeing GI. Hasn't done stool studies yet.        Current medicines (including changes today)  Current Outpatient Prescriptions   Medication Sig Dispense Refill   • sertraline (ZOLOFT) 100 MG Tab Take 1 Tab by mouth every day. 30 Tab 6   • triamcinolone acetonide (KENALOG) 0.1 % Ointment Apply 1 Application to affected area(s) 2 times a day for 5 days. 1 Tube 3   • ibuprofen (MOTRIN) 200 MG Tab Take 200 mg by mouth every 6 hours as needed.     • loratadine (CLARITIN) 10 MG Tab Take 10 mg by mouth every day.       No current facility-administered medications for this visit.      He  has a past medical history of Acne.    ROS   No fever/chills. No weight change. No headache/dizziness. No focal weakness. No sore throat, nasal congestion, ear pain. No chest pain, no shortness of breath, difficulty breathing.  No urinary complaint. No rash or skin lesion. No joint pain or swelling.       Objective:     Blood pressure 110/70, pulse 76, temperature 37 °C (98.6 °F), resp. rate 16, height 1.88 m (6' 2\"), weight 103.3 kg (227 lb 12.8 oz), SpO2 94 %. Body mass index is 29.25 kg/m².   Physical Exam:  Constitutional: WDWN, NAD  Skin: Warm, dry, good turgor, no rashes in visible areas.  Psych: Alert and oriented x3, normal affect and mood.        Assessment and Plan:   The following treatment plan was discussed    1. Mild episode of recurrent major depressive disorder " (CMS-HCC)    - sertraline (ZOLOFT) 100 MG Tab; Take 1 Tab by mouth every day.  Dispense: 30 Tab; Refill: 6    2. Eczema, unspecified type    - triamcinolone acetonide (KENALOG) 0.1 % Ointment; Apply 1 Application to affected area(s) 2 times a day for 5 days.  Dispense: 1 Tube; Refill: 3    3. Need for influenza vaccination    - INFLUENZA VACCINE QUAD INJ >3Y(PF)    4. Upper back pain  Cont stretching, strengthening, f/u with specialist as scheduled    5. Functional diarrhea  Stool studies, f/u with GI      Followup: Return in about 3 months (around 2/1/2018).

## 2017-11-02 NOTE — ASSESSMENT & PLAN NOTE
"Noticing improvement from before. More \"normal\" than he was before. Better energy. Fewer negative \"outbreaks\". No side effects. Would like to increase dose.  "

## 2017-11-02 NOTE — ASSESSMENT & PLAN NOTE
appt with physiatry today. Trigger point injections. Encouraged to do strengthening and stretching. Encouraged to look at ergonomics at work.

## 2017-11-28 ENCOUNTER — SLEEP CENTER VISIT (OUTPATIENT)
Dept: SLEEP MEDICINE | Facility: MEDICAL CENTER | Age: 27
End: 2017-11-28
Payer: COMMERCIAL

## 2017-11-28 VITALS
HEIGHT: 74 IN | BODY MASS INDEX: 29.13 KG/M2 | WEIGHT: 227 LBS | DIASTOLIC BLOOD PRESSURE: 76 MMHG | HEART RATE: 63 BPM | SYSTOLIC BLOOD PRESSURE: 110 MMHG | RESPIRATION RATE: 16 BRPM | OXYGEN SATURATION: 95 % | TEMPERATURE: 98.1 F

## 2017-11-28 DIAGNOSIS — G47.30 SLEEP APNEA, UNSPECIFIED TYPE: ICD-10-CM

## 2017-11-28 DIAGNOSIS — G47.10 HYPERSOMNOLENCE: ICD-10-CM

## 2017-11-28 DIAGNOSIS — R06.83 SNORING: ICD-10-CM

## 2017-11-28 PROCEDURE — 99244 OFF/OP CNSLTJ NEW/EST MOD 40: CPT | Performed by: INTERNAL MEDICINE

## 2017-11-28 RX ORDER — SERTRALINE HYDROCHLORIDE 25 MG/1
TABLET, FILM COATED ORAL
COMMUNITY
Start: 2017-09-14 | End: 2017-10-01

## 2017-11-28 NOTE — LETTER
November 28, 2017         Patient: Rene Figueroa   YOB: 1990   Date of Visit: 11/28/2017           To Whom it May Concern:    Rene Figueroa was seen in my clinic on 11/28/2017.     If you have any questions or concerns, please don't hesitate to call.        Sincerely,           Darrin Colvin M.D.  Electronically Signed

## 2017-12-04 NOTE — PROGRESS NOTES
CC:  Snoring, witnessed nocturnal apnea and excessive daytime somnolence, suspected sleep apnea hypopnea syndrome.    HPI:   Mr. Figueroa is a 27-year-old man referred by Dot Velarde PA-C, to assist in evaluation and management of suspected sleep-disordered breathing.    He has a regular sleep schedule with bedtime between 9 and 10 PM and he falls asleep quickly.  He does not awaken regularly at night but describes himself as a light sleeper.  He arises about 6 AM without overt fatigue, grogginess or morning headaches.  His wife notes that he does snore loudly and she's noticed periods of apnea in sleep, terminated by resuscitative gasping extending back at least 5 years.  He is tired during the day and may doze off during quiet moments.  He naps occasionally.  He is not yet completed the sleep diary or Pearl sleepiness score.  He drinks caffeinated beverages moderately and does not use substances to induce sleep or to maintain wakefulness.  He does not have symptoms suggesting narcolepsy, parasomnia or restless leg syndrome but he did have night terrors in childhood..  Specifically he does not have symptoms suggesting cataplexy or irresistible sleep episodes.  He has not previously been evaluated for sleep issues.  He notes that his father does have sleep apnea hypopnea syndrome treated with nocturnal CPAP.        Patient Active Problem List    Diagnosis Date Noted   • Functional diarrhea 11/02/2017   • Flexural eczema 11/02/2017   • Upper back pain 10/02/2017   • Sleep apnea 09/14/2017   • Mild episode of recurrent major depressive disorder (CMS-[facility]) 09/14/2017       Past Medical History:   Diagnosis Date   • Acne    • Anxiety        History reviewed. No pertinent surgical history.    Family History   Problem Relation Age of Onset   • Blood Clots Mother    • Hyperlipidemia Father    • Diabetes Father    • Sleep Apnea Father        Social History     Social History   • Marital status:      Spouse name: N/A  "  • Number of children: N/A   • Years of education: N/A     Occupational History   • Not on file.     Social History Main Topics   • Smoking status: Former Smoker     Types: Cigarettes     Quit date: 8/24/2017   • Smokeless tobacco: Never Used      Comment: 1-2 per week/quit recently   • Alcohol use 1.2 oz/week     2 Cans of beer per week   • Drug use:      Types: Marijuana   • Sexual activity: Yes     Partners: Female     Other Topics Concern   •  Service No   • Blood Transfusions No   • Caffeine Concern Yes   • Occupational Exposure No   • Hobby Hazards No   • Sleep Concern No   • Stress Concern No   • Weight Concern No   • Special Diet No   • Back Care Yes   • Exercise Yes   • Bike Helmet Yes   • Seat Belt Yes   • Self-Exams No     Social History Narrative   • No narrative on file       Current Outpatient Prescriptions   Medication Sig Dispense Refill   • sertraline (ZOLOFT) 100 MG Tab Take 1 Tab by mouth every day. 30 Tab 6   • ibuprofen (MOTRIN) 200 MG Tab Take 200 mg by mouth every 6 hours as needed.     • loratadine (CLARITIN) 10 MG Tab Take 10 mg by mouth every day.       No current facility-administered medications for this visit.     \"CURRENT RX\"      Allergies: Patient has no known allergies.      ROS        Physical Exam:   /76   Pulse 63   Temp 36.7 °C (98.1 °F)   Resp 16   Ht 1.88 m (6' 2\")   Wt 103 kg (227 lb)   SpO2 95%   BMI 29.15 kg/m²    Head and neck examination demonstrates no mucosal lesion, purulent drainage or evident polyps. The pharynx is benign with a Mallampati III presentation. The neck is supple without thyromegaly. On chest examination there are symmetrical bilateral breath sounds without rales, wheezing or consolidation. On cardiac examination, the apical impulse and heart sounds are normal and the rhythm is regular. There is no murmur, gallop or rub and no jugular venous distention. The abdomen is soft with active bowel sounds and no palpable hepatosplenomegaly, " mass, guarding or rebound. The extremities show no clubbing, cyanosis or edema and no signs of deep venous thrombosis. There is no warmth, redness, tenderness or palpable venous cord in the calves. The skin is clear, warm and dry. There is no unusual peripheral lymphadenopathy. Peripheral pulses are palpable in all 4 extremities. On neurologic examination, cranial nerve function is intact, motor tone is symmetrical, and the patient is alert, oriented and responsive.       Problems:  1. Sleep apnea, unspecified type  He presents with snoring, witnessed nocturnal apnea and excessive daytime somnolence.  He has a crowded posterior pharyngeal airway and a family history of sleep-disordered breathing.  The clinical probability of sleep apnea hypopnea syndrome is significant. Accurate diagnosis and effective treatment are required not only to improve levels of daytime alertness but also to reduce the cardiac and neurologic risks associated with untreated sleep apnea. We have discussed diagnostic options including in-laboratory, attended polysomnography and home sleep testing. We have also discussed treatment options including airway pressurization, reconstructive otolaryngologic surgery, dental appliances and weight management.    2. Hypersomnolence  Probably related to the suspected sleep-disordered breathing.  He is spending sufficient nightly time in bed and does not seem to have major issues with sleep hygiene.    3. Snoring      Plan:   Home sleep test.    Return visit afterwards to discuss test results and treatment options.    We appreciate the opportunity to assist in his care.  Answers for HPI/ROS submitted by the patient on 11/27/2017   Year of your last physical exam: 2009  Results of exam: Good  Occupation :   Height: 6'2  Current weight: 230  6 months ago: 240  At age 20: 190  What is the reason for your visit today?: Occasional gasping in sleep  Name of person referring you to the [facility]:  Cris Velarde  Have you ever been hospitalized?: No  Reason, year, and hospital in which you were hospitalized:: n/a  Have you ever had problems with anesthesia?: No  Have you experienced post-operative delirium?: No  Any complications with surgery?: No  What year did you receive your last Flu shot?: 2017  What year did you receive you last Pneumonia shot?: n/a  Have you had a TB skin test? If so, please list the year and result:: 2006 Negative  Have you had Allergy skin testing? If so, please list the year and result:: no  Please briefly describe your sleep problem and how old you were when it began.: 20  How does this affect your daily life and activities? Please also rate how serious of a problem this is (1 = Not at all, 10 = Very Serious).: 5- I am not too sure. In the last year it has lessened much more. But I used to feel like it made me tired at work back when I was 20.  Have you had any previous evaluations, examinations, or treatment for this sleep problem or any other problems with your sleep? If so, please describe the evaluation, treatment, and results.: no  Have you used any medications (prescribed or otherwise) to help your sleep problem? If yes, include name, amount, frequency, and the prescribing physician.: no  If employed, what time do you usually start and end work?: 7:30AM-4:00PM  Do you ever change work shifts? If yes, describe how often (never, infrequently, regularly).: No  What time do you usually go to bed and wake up on: Weekdays? Weekends?: 9:00PM-6:00AM, 10:00PM-6:00AM  Do you have a regular bed partner?: Yes  How many minutes does it usually take to fall asleep at night after turning off the lights?: 0  What do you ordinarily do just prior to turning out the lights and attempting to go to sleep (e.g., reading, TV, baths, etc.)?: Computer, Hygiene, sleep  On average, how many times do you wake up during the night?: 0  On average, how many times do you wake up to use the bathroom?: 0  Do  you often wake up too early in the morning and are unable to return to sleep?: 0  On average, how many hours of sleep do you get per night?: 8  How do you usually awaken?  Alarm, spontaneously, or other?: Alarm for 6:00AM; No Alarm on weekends  Is it difficult for you to awaken and get out of bed after sleeping? (Not at all, Sometimes, Very): Sometimes  Do you nap or return to bed after arising?: No  Are you bothered by sleepiness during the day?: Sometimes  Do you feel that you get too much sleep at night?: Sometimes, Not frequently  Do you feel that you get too little sleep at night?: Sometimes, Not frequently  Do you usually feel tired during the day? If so, what do you attribute this to?: Sometimes - Metabolism, Sleep quality, Mood.  Do you find yourself falling asleep when you don't mean to? : No  If yes, how long does your sleep episode last?: N/a  Have you ever suddenly fallen?: No  Have you ever experienced sudden body weakness?: No  Was the weakness brought on by any particular event or feeling? If so, briefly describe.: N/a  Have you ever experienced weakness or paralysis upon awakening from sleep?: 2011 - One time I woke up in the night unable to breath  and great fear until it passed and I gasped for air.  Have you ever experienced seeing things or hearing voices/noises: That weren't real? On going to sleep? During the night? On awakening from sleep? During the day?: Yes, Same incident described in last question. I saw a dark black shadow entity pushing on my chest causing me to be unable to breath.  Do you have difficulty breathing at night? If yes, briefly describe.: Sometimes I gasp for air  How many times per week?: one or two times a month my wife notices it  How did you become aware of this, at what age did this first occur, and how many years has this occurred?: My wife sleeps next to me.  Have you been told you snore while asleep? If so, does it disturb a bed partner (or someone in the same  "room), or someone in the next room?: No  Have you ever experienced doing something without being aware of the action? If yes, please describe.: Sometimes I have a hard time remembering the conversations I have before I go to sleep.  How many times per week does this occur?: 1  Have you ever experienced upon lying in bed before sleep or on awakening from sleep: Restlessness of legs, \"nervous legs\", \"creeping crawling\" sensation of legs, or twitching of legs?: no  How many times per week does this occur, and how many minutes does the sensation last?: n/a  At what age did this first occur, and how many years has this occurred?: n/a  Have you ever been told that your arms or legs jerk or twitch while you are asleep? If yes, how many times per night does this occur?: No  At what age did this first occur, and how many years has this occurred?: n/a  Do you know, or have you ever been told that you do any of the following while sleeping: talk, walk, grit teeth, wet the bed, wake up screaming or seemingly afraid, have disturbing dreams, have unusual movements, wake up with headaches, (males) have erections? If yes to any of these, please indicate how many times per week, age started, last occurrence, treatment received.: Had night terrors when I was young between 5-8, No real treatment, My father would calm me down in the hallway before going back to bed.  Has anyone in your family been known to have any sleep problems? If yes, please list the type of problem (e.g., trouble getting to sleep, too sleepy, bed wetting, etc.), the relationship of this person to you, and the treatment received.: Father has sleep Apnea. Uses mask and machine for sleep.    "

## 2017-12-08 ENCOUNTER — HOME STUDY (OUTPATIENT)
Dept: SLEEP MEDICINE | Facility: MEDICAL CENTER | Age: 27
End: 2017-12-08
Attending: INTERNAL MEDICINE
Payer: COMMERCIAL

## 2017-12-08 DIAGNOSIS — G47.10 HYPERSOMNOLENCE: ICD-10-CM

## 2017-12-08 DIAGNOSIS — G47.30 SLEEP APNEA, UNSPECIFIED TYPE: ICD-10-CM

## 2017-12-08 DIAGNOSIS — R06.83 SNORING: ICD-10-CM

## 2017-12-08 PROCEDURE — 95806 SLEEP STUDY UNATT&RESP EFFT: CPT | Performed by: FAMILY MEDICINE

## 2017-12-11 NOTE — PROCEDURES
DIAGNOSTIC HOME SLEEP TEST (HST) REPORT       PATIENT ID:  NAME:  Rene Figueroa  MRN:               [id number]  YOB: 1990  DATE OF STUDY: 12/10/17      Impression:     This study shows evidence of:    Moderate obstructive sleep apnea with  Respiratory Event Index (TG) of 25.2 per hour and worse in supine sleep with TG at 33.9/hr. These findings are based on the recording time (flow evaluation time). It is not possible with this device to determine a traditional apnea+hypopnea index (AHI) for total sleep time since EEG channels are not available.   O2 Sat. devan was 87% and mean O2 sat was 92% and baseline O2 at 95 %. O2 sat was below 90% for 4% of the flow evaluation time. Oxygen Desaturation (>=3%) Index was elevated at 24.0/hr.       TECHNICAL DESCRIPTION:  ResMed Device used was a type-III home study device. Home sleep study recording included: Airflow recording by nasal pressure transducer; Respiratory Effort by abdominal Respiratory Bands; O2 by finger oximetry. A position sensor and a snore channel was also used.    Scoring Criteria: A modification of the the AASM Manual for the Scoring of Sleep and Associated Events, 2012, was used.   Obstructive apnea was scored by cessation of airflow for at least 10 seconds with continuing respiratory effort.  Central apnea was scored by cessation of airflow for at least 10 seconds with no effort.  Hypopnea was scored by a 30% or more reduction in airflow for at least 10 seconds accompanied by an arterial oxygen desaturation of 3% or more.  (For Medicare patients, hypopneas were scored by a 30% or more reduction in airflow for at least 10 seconds accompanied by an arterial oxygen saturation of 4% or more, as required by their insurance, CMS.      INDICATION: snoring and EDS      General sleep summary: . Total recording time is 7 hours and 39 minutes and total flow evaluation time is 7 hours and 27 minutes. The patient spent 3 hours and 42  minutes in the supine position and 3 hours and 45 minutes in the nonsupine position.    Respiratory events:    Apneas: 41 (Obstructive apnea index 0.4/hr, Central apnea index 5.1 /hr, mixed 0 /hour)  Hypopneas: 147    Recommendations:    1. Due to elevated Central apnea index I recommend in lab CPAP titration study. Auto CPAP can exacerbate central apneas.   2. In general patients with sleep apnea are advised to avoid alcohol and sedatives and to not operate a motor vehicle while drowsy. In some cases alternative treatment options may prove effective in resolving sleep apnea in these options include upper airway surgery, the use of a dental orthotic or weight loss and positional therapy. Clinical correlation is required.           Desiree Emerson MD

## 2018-01-24 ENCOUNTER — SLEEP CENTER VISIT (OUTPATIENT)
Dept: SLEEP MEDICINE | Facility: MEDICAL CENTER | Age: 28
End: 2018-01-24
Payer: COMMERCIAL

## 2018-01-24 VITALS
HEIGHT: 74 IN | RESPIRATION RATE: 16 BRPM | DIASTOLIC BLOOD PRESSURE: 75 MMHG | BODY MASS INDEX: 27.85 KG/M2 | HEART RATE: 81 BPM | OXYGEN SATURATION: 97 % | WEIGHT: 217 LBS | SYSTOLIC BLOOD PRESSURE: 120 MMHG | TEMPERATURE: 98 F

## 2018-01-24 DIAGNOSIS — G47.10 HYPERSOMNOLENCE: ICD-10-CM

## 2018-01-24 DIAGNOSIS — R06.83 SNORING: ICD-10-CM

## 2018-01-24 DIAGNOSIS — G47.30 SLEEP APNEA, UNSPECIFIED TYPE: ICD-10-CM

## 2018-01-24 PROCEDURE — 99214 OFFICE O/P EST MOD 30 MIN: CPT | Performed by: INTERNAL MEDICINE

## 2018-01-24 RX ORDER — ZOLPIDEM TARTRATE 5 MG/1
5 TABLET ORAL NIGHTLY PRN
Qty: 3 TAB | Refills: 0 | Status: SHIPPED | OUTPATIENT
Start: 2018-01-24 | End: 2018-01-31

## 2018-01-24 RX ORDER — FLUTICASONE PROPIONATE 50 MCG
SPRAY, SUSPENSION (ML) NASAL
COMMUNITY
Start: 2017-12-01

## 2018-01-24 NOTE — PROGRESS NOTES
Chief Complaint   Patient presents with   • Results     Home sleep test       HPI:  The patient returns after his home sleep study.  To reiterate, he does not awaken regularly at night but describes himself as a light sleeper.  He arises about 6 AM without overt fatigue, grogginess or morning headaches.  His wife notes that he does snore loudly and she's noticed periods of apnea in sleep, terminated by resuscitative gasping extending back at least 5 years.  He is tired during the day and may doze off during quiet moments.  He naps occasionally.    His home sleep study demonstrates    Moderate obstructive sleep apnea with  Respiratory Event Index (TG) of 25.2 per hour and worse in supine sleep with TG at 33.9/hr. These findings are based on the recording time (flow evaluation time). It is not possible with this device to determine a traditional apnea+hypopnea index (AHI) for total sleep time since EEG channels are not available.   O2 Sat. devan was 87% and mean O2 sat was 92% and baseline O2 at 95 %. O2 sat was below 90% for 4% of the flow evaluation time. Oxygen Desaturation (>=3%) Index was elevated at 24.0/hr.         Past Medical History:   Diagnosis Date   • Acne    • Anxiety        ROS:   Constitutional: Denies fevers, chills, night sweats, fatigue or weight loss  Eyes: Denies vision loss, pain, drainage, double vision  Ears, Nose, Throat: Denies earache, tinnitus, hoarseness  Cardiovascular: Denies chest pain, tightness, palpitations  Respiratory: Denies shortness of breath, sputum production, cough, hemoptysis  Sleep: See prior history of present illness  GI: Denies abdominal pain, nausea, vomiting, diarrhea  : Denies frequent urination, hematuria, painful urination  Musculoskeletal: Denies back pain, painful joints, sore muscles  Neurological: Denies headaches, seizures  Skin: Denies rashes, color changes  Psychiatric: Denies depression or thoughts of suicide  Hematologic: Denies bleeding tendency or  "clotting tendency  Allergic/Immunologic: Denies rhinitis, skin sensitivity    Social History     Social History   • Marital status:      Spouse name: N/A   • Number of children: N/A   • Years of education: N/A     Occupational History   • Not on file.     Social History Main Topics   • Smoking status: Former Smoker     Types: Cigarettes     Quit date: 8/24/2017   • Smokeless tobacco: Never Used      Comment: 1-2 per week/quit recently   • Alcohol use 1.2 oz/week     2 Cans of beer per week   • Drug use: Yes     Types: Marijuana   • Sexual activity: Yes     Partners: Female     Other Topics Concern   •  Service No   • Blood Transfusions No   • Caffeine Concern Yes   • Occupational Exposure No   • Hobby Hazards No   • Sleep Concern No   • Stress Concern No   • Weight Concern No   • Special Diet No   • Back Care Yes   • Exercise Yes   • Bike Helmet Yes   • Seat Belt Yes   • Self-Exams No     Social History Narrative   • No narrative on file     Patient has no known allergies.  Current Outpatient Prescriptions on File Prior to Visit   Medication Sig Dispense Refill   • sertraline (ZOLOFT) 100 MG Tab Take 1 Tab by mouth every day. 30 Tab 6   • ibuprofen (MOTRIN) 200 MG Tab Take 200 mg by mouth every 6 hours as needed.     • loratadine (CLARITIN) 10 MG Tab Take 10 mg by mouth every day.       No current facility-administered medications on file prior to visit.      Blood pressure 120/75, pulse 81, temperature 36.7 °C (98 °F), resp. rate 16, height 1.88 m (6' 2\"), weight 98.4 kg (217 lb), SpO2 97 %.  Family History   Problem Relation Age of Onset   • Blood Clots Mother    • Hyperlipidemia Father    • Diabetes Father    • Sleep Apnea Father        Physical Exam:    HEENT: PERRLA, EOMI, no scleral icterus, no nasal or oral lesions  Neck: No thyromegaly, no adenopathy, no bruits  Mallampatti: Grade II  Lungs: Equal breath sounds, no wheezes or crackles  Heart: Regular rate and rhythm, no gallops or " murmurs  Abdomen: Soft, benign, no organomegaly  Extremities: No clubbing, cyanosis, or edema  Neurologic: Cranial nerve, motor, and sensory exam are normal    1. Sleep apnea, unspecified type    2. Hypersomnolence    3. Snoring      We reviewed the study with the patient. He does have evidence of obstructive sleep apnea. Due to elevated Central apnea index it was recommended to perform an in lab CPAP titration study. Auto CPAP can exacerbate central apneas.   We will set him up for a CPAP titration.  We have provided him Ambien to use on the night of the study in case he cannot sleep. We will see him back after his titration study.

## 2018-02-07 ENCOUNTER — OFFICE VISIT (OUTPATIENT)
Dept: MEDICAL GROUP | Facility: MEDICAL CENTER | Age: 28
End: 2018-02-07
Payer: COMMERCIAL

## 2018-02-07 VITALS
DIASTOLIC BLOOD PRESSURE: 70 MMHG | OXYGEN SATURATION: 97 % | HEART RATE: 60 BPM | HEIGHT: 74 IN | SYSTOLIC BLOOD PRESSURE: 110 MMHG | RESPIRATION RATE: 16 BRPM | WEIGHT: 217.4 LBS | BODY MASS INDEX: 27.9 KG/M2

## 2018-02-07 DIAGNOSIS — M54.9 UPPER BACK PAIN: ICD-10-CM

## 2018-02-07 DIAGNOSIS — G47.30 SLEEP APNEA, UNSPECIFIED TYPE: ICD-10-CM

## 2018-02-07 DIAGNOSIS — F33.0 MILD EPISODE OF RECURRENT MAJOR DEPRESSIVE DISORDER (HCC): ICD-10-CM

## 2018-02-07 DIAGNOSIS — K59.1 FUNCTIONAL DIARRHEA: ICD-10-CM

## 2018-02-07 PROCEDURE — 99214 OFFICE O/P EST MOD 30 MIN: CPT | Performed by: PHYSICIAN ASSISTANT

## 2018-02-07 RX ORDER — SERTRALINE HYDROCHLORIDE 100 MG/1
150 TABLET, FILM COATED ORAL DAILY
Qty: 45 TAB | Refills: 6 | Status: SHIPPED | OUTPATIENT
Start: 2018-02-07 | End: 2018-03-09

## 2018-02-08 NOTE — ASSESSMENT & PLAN NOTE
Did see physiatry. Trigger point injections only helped for one day. Didn't do PT or follow up. Just dealing with it on his own.

## 2018-02-08 NOTE — PROGRESS NOTES
"Subjective:   Rene Figueroa is a 27 y.o. male here today for follow up depression    Mild episode of recurrent major depressive disorder (CMS-HCC)  Started 2 weeks ago. Feeling like med isn't working as well. Starting to notice changing back to old personality habits - less able to control his outbursts of anger. Would like to increase the dose.    Sleep apnea  Has sleep study scheduled.    Upper back pain  Did see physiatry. Trigger point injections only helped for one day. Didn't do PT or follow up. Just dealing with it on his own.    Functional diarrhea  Did see GI. Had tests ordered. Hasn't followed up.       Current medicines (including changes today)  Current Outpatient Prescriptions   Medication Sig Dispense Refill   • sertraline (ZOLOFT) 100 MG Tab Take 1.5 Tabs by mouth every day for 30 days. 45 Tab 6   • fluticasone (FLONASE) 50 MCG/ACT nasal spray 1 time daily as needed.     • sertraline (ZOLOFT) 100 MG Tab Take 1 Tab by mouth every day. 30 Tab 6   • ibuprofen (MOTRIN) 200 MG Tab Take 200 mg by mouth every 6 hours as needed.     • loratadine (CLARITIN) 10 MG Tab Take 10 mg by mouth every day.       No current facility-administered medications for this visit.      He  has a past medical history of Acne and Anxiety.    ROS   No fever/chills. No weight change. No headache/dizziness. No focal weakness. No sore throat, nasal congestion, ear pain. No chest pain, no shortness of breath, difficulty breathing. No n/v/d/c or abdominal pain. No urinary complaint. No rash or skin lesion. No joint pain or swelling.       Objective:     Blood pressure 110/70, pulse 60, resp. rate 16, height 1.88 m (6' 2\"), weight 98.6 kg (217 lb 6.4 oz), SpO2 97 %. Body mass index is 27.91 kg/m².   Physical Exam:  Constitutional: WDWN, NAD  Skin: Warm, dry, good turgor, no rashes in visible areas.  Psych: Alert and oriented x3, normal affect and mood.        Assessment and Plan:   The following treatment plan was discussed    1. " Mild episode of recurrent major depressive disorder (CMS-HCC)    - sertraline (ZOLOFT) 100 MG Tab; Take 1.5 Tabs by mouth every day for 30 days.  Dispense: 45 Tab; Refill: 6  - VITAMIN B12; Future  - VITAMIN D,25 HYDROXY; Future    2. Sleep apnea, unspecified type  Will f/u with sleep study    3. Upper back pain  PT if problem worsens    4. Functional diarrhea  F/u with GI      Followup: Return in about 3 months (around 5/7/2018).

## 2018-02-08 NOTE — ASSESSMENT & PLAN NOTE
Started 2 weeks ago. Feeling like med isn't working as well. Starting to notice changing back to old personality habits - less able to control his outbursts of anger. Would like to increase the dose.

## 2018-03-09 ENCOUNTER — APPOINTMENT (OUTPATIENT)
Dept: SLEEP MEDICINE | Facility: MEDICAL CENTER | Age: 28
End: 2018-03-09
Attending: INTERNAL MEDICINE
Payer: COMMERCIAL

## 2018-03-15 ENCOUNTER — APPOINTMENT (OUTPATIENT)
Dept: SLEEP MEDICINE | Facility: MEDICAL CENTER | Age: 28
End: 2018-03-15
Payer: COMMERCIAL

## 2018-03-20 ENCOUNTER — HOSPITAL ENCOUNTER (OUTPATIENT)
Dept: LAB | Facility: MEDICAL CENTER | Age: 28
End: 2018-03-20
Attending: PHYSICIAN ASSISTANT
Payer: COMMERCIAL

## 2018-03-20 DIAGNOSIS — F33.0 MILD EPISODE OF RECURRENT MAJOR DEPRESSIVE DISORDER (HCC): ICD-10-CM

## 2018-03-20 LAB
25(OH)D3 SERPL-MCNC: 21 NG/ML (ref 30–100)
VIT B12 SERPL-MCNC: 571 PG/ML (ref 211–911)

## 2018-03-20 PROCEDURE — 82306 VITAMIN D 25 HYDROXY: CPT

## 2018-03-20 PROCEDURE — 82607 VITAMIN B-12: CPT

## 2018-03-20 PROCEDURE — 36415 COLL VENOUS BLD VENIPUNCTURE: CPT

## 2018-03-28 ENCOUNTER — OFFICE VISIT (OUTPATIENT)
Dept: MEDICAL GROUP | Facility: MEDICAL CENTER | Age: 28
End: 2018-03-28
Payer: COMMERCIAL

## 2018-03-28 VITALS
BODY MASS INDEX: 26.98 KG/M2 | OXYGEN SATURATION: 94 % | RESPIRATION RATE: 16 BRPM | TEMPERATURE: 98.3 F | SYSTOLIC BLOOD PRESSURE: 120 MMHG | DIASTOLIC BLOOD PRESSURE: 68 MMHG | WEIGHT: 210.2 LBS | HEART RATE: 82 BPM | HEIGHT: 74 IN

## 2018-03-28 DIAGNOSIS — E55.9 VITAMIN D DEFICIENCY: ICD-10-CM

## 2018-03-28 DIAGNOSIS — G47.30 SLEEP APNEA, UNSPECIFIED TYPE: ICD-10-CM

## 2018-03-28 DIAGNOSIS — M54.9 UPPER BACK PAIN: ICD-10-CM

## 2018-03-28 DIAGNOSIS — F41.1 GAD (GENERALIZED ANXIETY DISORDER): ICD-10-CM

## 2018-03-28 DIAGNOSIS — R07.9 INTERMITTENT CHEST PAIN: ICD-10-CM

## 2018-03-28 PROCEDURE — 99214 OFFICE O/P EST MOD 30 MIN: CPT | Performed by: NURSE PRACTITIONER

## 2018-03-28 RX ORDER — SERTRALINE HYDROCHLORIDE 100 MG/1
200 TABLET, FILM COATED ORAL
Qty: 60 TAB | Refills: 1 | Status: SHIPPED | OUTPATIENT
Start: 2018-03-28 | End: 2018-04-27 | Stop reason: SDUPTHER

## 2018-03-28 ASSESSMENT — PATIENT HEALTH QUESTIONNAIRE - PHQ9: CLINICAL INTERPRETATION OF PHQ2 SCORE: 0

## 2018-03-28 NOTE — ASSESSMENT & PLAN NOTE
"\"soreness and aching\" ongoing for the last several months. He was seen by cardiology, had an EKG as well as stress test both of which were normal  Denies  heartburn, shortness of breath with exertion, tachycardia  "

## 2018-03-28 NOTE — ASSESSMENT & PLAN NOTE
Ongoing for the last several months, was seen by Dr. Sauceda and had trigger point injections with very short-term improvement. Referred for physical therapy but has not scheduled. His x-ray was normal other than very slight scoliosis  He notes muscle tension in the neck as well, history to stretch regularly.  No numbness, tingling, or weakness in upper extremities. No history of injury

## 2018-03-28 NOTE — PROGRESS NOTES
"Subjective:     Chief Complaint   Patient presents with   • Establish Care   • Chest Pain     left                        Rene Figueroa is a 27 y.o. male here to transfer care from [facility], he was referred by his wife who is a patient of mine. We discussed:    Sleep apnea  Recently diagnosed  Home sleep study with AHI 25.2  Will be having CPAP titration study, has follow-up scheduled with pulmonology  Denies daytime fatigue, memory difficulty. He does wake frequently    LULA (generalized anxiety disorder)  Diagnosed last fall, was having panic attacks at the time. Called 911 feeling as though he was having a heart attack.  Overall has had some improvement since starting Zoloft but not yet at goal. Feels that he still worrying excessively, often about his health or politics  Denies depression, insomnia, appetite changes    Intermittent chest pain  \"soreness and aching\" ongoing for the last several months. He was seen by cardiology, had an EKG as well as stress test both of which were normal  Denies  heartburn, shortness of breath with exertion, tachycardia    Vitamin D deficiency  Recent level of 21    Upper back pain  Ongoing for the last several months, was seen by Dr. Sauceda and had trigger point injections with very short-term improvement. Referred for physical therapy but has not scheduled. His x-ray was normal other than very slight scoliosis  He notes muscle tension in the neck as well, history to stretch regularly.  No numbness, tingling, or weakness in upper extremities. No history of injury     Recent labs reviewed    Current medicines (including changes today)  Current Outpatient Prescriptions   Medication Sig Dispense Refill   • sertraline (ZOLOFT) 100 MG Tab Take 2 Tabs by mouth at bedtime as needed. 60 Tab 1   • sertraline (ZOLOFT) 100 MG Tab Take 1 Tab by mouth every day. 30 Tab 6   • fluticasone (FLONASE) 50 MCG/ACT nasal spray 1 time daily as needed.     • ibuprofen (MOTRIN) 200 MG Tab " "Take 200 mg by mouth every 6 hours as needed.     • loratadine (CLARITIN) 10 MG Tab Take 10 mg by mouth every day.       No current facility-administered medications for this visit.      He  has a past medical history of Acne and Anxiety.    ROS included above     Objective:     Blood pressure 120/68, pulse 82, temperature 36.8 °C (98.3 °F), resp. rate 16, height 1.87 m (6' 1.62\"), weight 95.3 kg (210 lb 3.2 oz), SpO2 94 %. Body mass index is 27.27 kg/m².     Physical Exam:  General: Alert, oriented in no acute distress.  Eye contact is good, speech is normal, affect calm  Lungs: clear to auscultation bilaterally, normal effort, no wheeze/ rhonchi/ rales.  CV: regular rate and rhythm, S1, S2, no murmur  Abdomen: soft, nontender   MS: No tenderness over the spine, normal flexion and extension of the neck, normal left-to-right rotation. Normal strength in bilateral upper extremities. Mid thoracic muscular tenderness with right greater than left, no hypertonicity.   Ext: no edema, color normal, vascularity normal, temperature normal    Assessment and Plan:   The following treatment plan was discussed   1. LULA (generalized anxiety disorder)  Uncontrolled. Increase Zoloft to 200 mg daily, we will follow up on this in one month   sertraline (ZOLOFT) 100 MG Tab   2. Sleep apnea, unspecified type  Patient has upcoming appointment with pulmonology, will likely be started on CPAP    3. Intermittent chest pain  Has had thorough cardiac evaluation with no underlying cause identified. Possibly related to anxiety, we will see how this progresses over the next month with change of Zoloft dose    4. Vitamin D deficiency  2000 international units vitamin D3 twice daily    5. Upper back pain  X-ray and prior notes reviewed. Encouraged patient to schedule for physical therapy, new referral placed   REFERRAL TO PHYSICAL THERAPY Reason for Therapy: Eval/Treat/Report       Followup: Return in about 4 weeks (around 4/25/2018).     "     Please note that this dictation was created using voice recognition software. I have worked with consultants from the vendor as well as technical experts from [facility] to optimize the interface. I have made every reasonable attempt to correct obvious errors, but I expect that there are errors of grammar and possibly content that I did not discover before finalizing the note.

## 2018-03-28 NOTE — ASSESSMENT & PLAN NOTE
Diagnosed last fall, was having panic attacks at the time. Called 911 feeling as though he was having a heart attack.  Overall has had some improvement since starting Zoloft but not yet at goal. Feels that he still worrying excessively, often about his health or politics  Denies depression, insomnia, appetite changes

## 2018-04-02 ENCOUNTER — PHYSICAL THERAPY (OUTPATIENT)
Dept: PHYSICAL THERAPY | Facility: MEDICAL CENTER | Age: 28
End: 2018-04-02
Attending: NURSE PRACTITIONER
Payer: COMMERCIAL

## 2018-04-02 DIAGNOSIS — M54.9 DORSALGIA: ICD-10-CM

## 2018-04-02 PROCEDURE — 97162 PT EVAL MOD COMPLEX 30 MIN: CPT

## 2018-04-02 NOTE — OP THERAPY EVALUATION
Outpatient Physical Therapy  INITIAL EVALUATION    [facility]  [address]  [place] [address]  Phone:  [phone]  Fax:  [phone]    Date of Evaluation: 04/02/2018    Patient: Rene Figueroa  YOB: 1990  MRN: [id number]     Referring Provider: PATRICIO Ashton  [address]  [address]  [place] [address]   Referring Diagnosis Upper back pain [M54.9]     Time Calculation  Start time: 0100  Stop time: 0200 Time Calculation (min): 60 minutes     Physical Therapy Occurrence Codes    Date of onset of impairment:  9/1/17   Date physical therapy care plan established or reviewed:  4/2/18   Date physical therapy treatment started:  4/2/18          Chief Complaint: Back Problem    Visit Diagnoses     ICD-10-CM   1. Dorsalgia M54.9         Subjective  Spontaneous onset of L rib pain since September 2017 Pain orginally L ant chest,then progressed to L arm and posterior R ribs.Pain increased with lifting or carrying sitting at computer,decreased with pulling shoulders back.Pt also complains numbness R post thorasic T 10 area  Past Medical History:   Diagnosis Date   • Acne    • Anxiety      No past surgical history on file.  Social History   Substance Use Topics   • Smoking status: Former Smoker     Types: Cigarettes     Quit date: 8/24/2017   • Smokeless tobacco: Never Used      Comment: 1-2 per week/quit recently   • Alcohol use 1.2 oz/week     2 Cans of beer per week     Family and Occupational History     Social History   • Marital status:      Spouse name: N/A   • Number of children: N/A   • Years of education: N/A       Objective  Alignment looks good except for ant R ribs 3-8  Lumbar ROM is WNL  thorasic is moderately stiff   On palpation tender lower and upper sternum,L ant ribs 4-8,post R ribs 8 -10  5/5 muscle strength all groups  Area of decreased sensation R posterior T 10    Exercises/Treatment  Time-based  treatments/modalities:   SCS sternum,ant L ribs ,post R ribs  P/A thorasic   HEP for stretches    Assessment, Response and Plan:   Prognosis: good    Goals:   Short Term Goals:   Painfree lifting and carrying  painfree sitting > 1hr  Short term goal time span:  2-4 weeks      Long Term Goals:    Same as STG  Long term goal time span:  2-4 weeks    Plan:   Planned therapy interventions:  Manual Therapy (CPT 75526), Therapeutic Activities (CPT 72389) and Therapeutic Exercise (CPT 37164)  Frequency:  2x week  Duration in weeks:  4  Duration in visits:  8    Understands HEP  Functional Limitation G-Codes and Severity Modifiers      Current:     Goal:       Referring provider co-signature:  I have reviewed this plan of care and my co-signature certifies the need for services.  Certification Dates:   From 04/02/18     To 05/02/18    Physician Signature: ________________________________ Date: ______________

## 2018-04-09 ENCOUNTER — TELEPHONE (OUTPATIENT)
Dept: SLEEP MEDICINE | Facility: MEDICAL CENTER | Age: 28
End: 2018-04-09

## 2018-04-09 DIAGNOSIS — G47.30 SLEEP-DISORDERED BREATHING: ICD-10-CM

## 2018-04-09 NOTE — TELEPHONE ENCOUNTER
Pt lost his rx for abien. Would like a new one called in to the [facility] at [facility]. Please call him when it has been done at [phone]

## 2018-04-10 ENCOUNTER — TELEPHONE (OUTPATIENT)
Dept: PULMONOLOGY | Facility: HOSPICE | Age: 28
End: 2018-04-10

## 2018-04-10 ENCOUNTER — APPOINTMENT (OUTPATIENT)
Dept: PHYSICAL THERAPY | Facility: MEDICAL CENTER | Age: 28
End: 2018-04-10
Attending: NURSE PRACTITIONER
Payer: COMMERCIAL

## 2018-04-10 RX ORDER — ZOLPIDEM TARTRATE 5 MG/1
5 TABLET ORAL NIGHTLY PRN
Qty: 3 TAB | Refills: 0 | Status: SHIPPED | OUTPATIENT
Start: 2018-04-10 | End: 2018-04-13

## 2018-04-10 NOTE — TELEPHONE ENCOUNTER
Patient's appointment is on 04/12/2018 - please sign for new prescription if okay.  Edilberto not working on my end

## 2018-04-12 ENCOUNTER — SLEEP STUDY (OUTPATIENT)
Dept: SLEEP MEDICINE | Facility: MEDICAL CENTER | Age: 28
End: 2018-04-12
Payer: COMMERCIAL

## 2018-04-12 DIAGNOSIS — G47.30 SLEEP APNEA, UNSPECIFIED TYPE: ICD-10-CM

## 2018-04-12 PROCEDURE — 95811 POLYSOM 6/>YRS CPAP 4/> PARM: CPT | Performed by: FAMILY MEDICINE

## 2018-04-13 NOTE — PROCEDURES
Clinical Comments:  The patient underwent a CPAP titration using the standard montage for measurement of parameters of sleep, respiratory events, movement abnormalities, heart rate and rhythm. A microphone was used to monitor snoring.        INTERPRETATION:  The study was started at 8:08 PM.  The total recording time was 386.1 minutes with a sleep period of 420.0 minutes and the total sleep time was 319.5 minutes with a sleep efficiency of 82.8%.  The sleep latency was 20.0 minutes, and REM latency was 119.5 minutes.  The patient experienced 61 arousals in total, for an arousal index of 11.5     RESPIRATORY: The patient had 25 apneas in total.  Of these, 0 were obstructive apneas, and 25 were central apneas.  This resulted in an apnea index (AI) of 4.7.  The patient had 53 hypopneas, for a hypopnea index of 10.0.  The overall AHI was 14.6, while the AHI during Stage R sleep was 12.9.  AHI while supine was 5.5.     OXIMETRY: Oxygen saturation monitoring showed a mean SpO2 of 94.1%, with a minimum oxygen saturation of 87.0%.  Oxygen saturations were less than or = 89% for 5.5 minutes of sleep time.     CARDIAC: The highest heart rate during the recording was 100.0 beats per minute.  The average heart rate during sleep was 100.0 bpm.     LIMB MOVEMENTS: There were a total of 119 PLMs during sleep, of which 3 were PLMs arousals.  This resulted in a PLMS index of 22.3.     CPAP was tried from 5 to 8cm H2O.  BIPAP was tried from 8/4 to 12/8cm H2O.      Technical summary: The patient underwent a CPAP?BiPAP titration.  This was a 16 channel montage study to include a 6 channel EEG, a 2 channel EOG, and chin EMG, left and right leg EMG, a snore channel, and a CFLOW pressure transducer.   Respiratory effort was assessed with the use of a thoracic and abdominal monitor and overnight oximetry was obtained. Audio and video recordings were reviewed. This was a fully attended study and sleep stage scoring was performed. The test  was technically adequate.    General sleep summary:  During the overnight study, the sleep latency was 37 min which is prolonged. The REM latency was 119 min which is increased. The total sleep time was 319 min and sleep efficiency was 82 % which is decreased.  Sleep stage proportions showed increased N3 sleep and decreased REM .  In regards to sleep quality there was a mild degree of sleep fragmentation as shown by the arousal index of 11 an hour. The arousals were due to spontaneous arousal .    CPAP Titration:  The PAP titration was initiated with CPAP 5 cm of water and the pressure which was slowly titrated up in an attempt to eliminate sleep disordered breathing and snoring. The CPAP was increased to CPAP 8 cm before switching to BiPAP due to central apneas. The BiPAP was titrated between 8/4 to 12/8 cm. However the best tolerated pressure was 11/7 cm  at this pressure the patient was observed in the n on supine position  and no REM sleep stage. The apnea hypopnea index improved to 2.8 per hour and O2 devan 92%. The average O2 stauration was 93%. He spent 0.3 % of sleep time below 89% O2 saturation. Snoring was resolved. There were no significant periodic limb movements.  The patient demonstrated NSR and an average heart rate of 59 beats per minute.  There was no ventricular ectopy or tachyarrhythmias. The patient utilized medium Eson mask with heated humidification. The CPAP was well-tolerated and there were minimal air leaks. No supplemental oxygen was required.    Impression:  1.  LEX  2.  Treatment emergent central apneas      Recommendations:  I recommend  BiPAP 11/7 cm with medium Eson mask. Recommended 30 day compliance download to assess the efficacy of the recommended pressure and compliance for further outpatient monitoring and management of CPAP therapy. In some cases alternative treatment options may prove effective in resolving sleep apnea and these options include upper airway surgery, the use of  a dental orthotic or weight loss and positional therapy. Clinical correlation is required. In general patients with sleep apnea are advised to avoid alcohol and sedatives and to not operate a motor vehicle while drowsy and are at a greater risk for cardiovascular disease.

## 2018-04-20 ENCOUNTER — APPOINTMENT (OUTPATIENT)
Dept: PHYSICAL THERAPY | Facility: MEDICAL CENTER | Age: 28
End: 2018-04-20
Attending: NURSE PRACTITIONER
Payer: COMMERCIAL

## 2018-04-24 ENCOUNTER — SLEEP CENTER VISIT (OUTPATIENT)
Dept: SLEEP MEDICINE | Facility: MEDICAL CENTER | Age: 28
End: 2018-04-24
Payer: COMMERCIAL

## 2018-04-24 VITALS
RESPIRATION RATE: 15 BRPM | WEIGHT: 207 LBS | BODY MASS INDEX: 26.56 KG/M2 | HEIGHT: 74 IN | HEART RATE: 95 BPM | OXYGEN SATURATION: 96 % | SYSTOLIC BLOOD PRESSURE: 120 MMHG | DIASTOLIC BLOOD PRESSURE: 74 MMHG

## 2018-04-24 DIAGNOSIS — G47.33 OBSTRUCTIVE SLEEP APNEA SYNDROME: ICD-10-CM

## 2018-04-24 PROCEDURE — 99213 OFFICE O/P EST LOW 20 MIN: CPT | Performed by: FAMILY MEDICINE

## 2018-04-24 NOTE — PROGRESS NOTES
[facility] Follow Up Note     Date: 4/24/2018 / Time: 2:48 PM    Patient ID:   Name:             Rene Figueroa   YOB: 1990  Age:                 27 y.o.  male   MRN:               [id number]      Thank you for requesting a sleep medicine consultation on Rene Figueroa at the sleep center. He presents today with the chief complaints of LEX and SS follow up.     HISTORY OF PRESENT ILLNESS:       Pt is currently not on PAP therapy. He goes to sleep around 8-11 pm and wakes up around 5 am. He is getting about 7 hrs of sleep on a good night and about 6 hr of sleep on a bad night. The bad nights are about 4 per week.The symptoms of excessive daytime, snoring and gasping has continued.     Since his last study he had titration study.The PAP titration was initiated with CPAP 5 cm of water and the pressure which was slowly titrated up in an attempt to eliminate sleep disordered breathing and snoring. The CPAP was increased to CPAP 8 cm before switching to BiPAP due to central apneas. The BiPAP was titrated between 8/4 to 12/8 cm. However the best tolerated pressure was 11/7 cm  at this pressure the patient was observed in the n on supine position  and no REM sleep stage. The apnea hypopnea index improved to 2.8 per hour and O2 devan 92%. The average O2 stauration was 93%. He spent 0.3 % of sleep time below 89% O2 saturation    SLEEP HISTORY   Moderate obstructive sleep apnea with  Respiratory Event Index (TG) of 25.2 per hour and worse in supine sleep with TG at 33.9/hr. O2 Sat. devan was 87% and mean O2 sat was 92% and baseline O2 at 95 %. O2 sat was below 90% for 4% of the flow evaluation time. Oxygen Desaturation (>=3%) Index was elevated at 24.0/hr.       REVIEW OF SYSTEMS:       Constitutional: Denies fevers, Denies weight changes  Eyes: Denies changes in vision, no eye pain  Ears/Nose/Throat/Mouth: Denies nasal congestion or sore throat   Cardiovascular: Denies chest pain or  "palpitations   Respiratory: Denies shortness of breath , Denies cough  Gastrointestinal/Hepatic: Denies abdominal pain, nausea, vomiting, diarrhea, constipation or GI bleeding   Genitourinary: Denies bladder dysfunction, dysuria or frequency  Musculoskeletal/Rheum: Denies  joint pain and swelling   Skin/Breast: Denies rash,   Neurological: Denies headache, confusion, memory loss or focal weakness/parasthesias  Psychiatric: denies mood disorder     Comprehensive review of systems form is reviewed with the patient and is attached in the EMR.     PMH:  has a past medical history of Acne and Anxiety.  MEDS:   Current Outpatient Prescriptions:   •  sertraline (ZOLOFT) 100 MG Tab, Take 2 Tabs by mouth at bedtime as needed., Disp: 60 Tab, Rfl: 1  •  fluticasone (FLONASE) 50 MCG/ACT nasal spray, 1 time daily as needed., Disp: , Rfl:   •  sertraline (ZOLOFT) 100 MG Tab, Take 1 Tab by mouth every day., Disp: 30 Tab, Rfl: 6  •  ibuprofen (MOTRIN) 200 MG Tab, Take 200 mg by mouth every 6 hours as needed., Disp: , Rfl:   •  loratadine (CLARITIN) 10 MG Tab, Take 10 mg by mouth every day., Disp: , Rfl:   ALLERGIES: No Known Allergies  SURGHX: History reviewed. No pertinent surgical history.  SOCHX:  reports that he quit smoking about 7 months ago. His smoking use included Cigarettes. He has never used smokeless tobacco. He reports that he drinks about 1.2 oz of alcohol per week . He reports that he uses drugs, including Marijuana..  FH:   Family History   Problem Relation Age of Onset   • Blood Clots Mother    • Hyperlipidemia Father    • Diabetes Father    • Sleep Apnea Father          Physical Exam:  Vitals/ General Appearance:   Weight/BMI: Body mass index is 26.58 kg/m².  Blood pressure 120/74, pulse 95, resp. rate 15, height 1.88 m (6' 2\"), weight 93.9 kg (207 lb), SpO2 96 %.  Vitals:    04/24/18 1442   BP: 120/74   Pulse: 95   Resp: 15   SpO2: 96%   Weight: 93.9 kg (207 lb)   Height: 1.88 m (6' 2\")       Pt. is alert and " oriented to time, place and person. Cooperative and in no apparent distress.       1. Head: Atraumatic, normocephalic.   2. Ears: Normal tympanic membrane and no discharge  3. Nose: No inferior turbinate hypertophy, no septal deviation, no polyp.   4. Throat: Oropharynx appears crowded in that the palate is overhanging (Malam Hetal scale 3).5. Neck: Supple. No thyromegaly  6. Chest: Trachea central, no spine deformity   7. Lungs auscultation: B/L good air entry, vesicular breath sounds, no adventitious sounds  8. Heart auscultation: 1st and 2nd heart sounds normal, regular rhythm. No appreciable murmur.  9. Abdomen: Soft, non tender, no organomegaly. Bowel sounds present  10. Extremities: no clubbing, no pedal edema.  11. Skin: No rash  12. NEUROLOGICAL EXAMINATION: On neurological exam, the patient was alert and oriented x3. speech was clear and fluent without dysarthria.      INVESTIGATIONS:           ASSESSMENT AND PLAN     1.Obstructive Sleep Apnea (LEX).the symptoms of excessive daytime, snoring and gasping has perisits       The pathophysiology of LEX and the increased risk of cardiovascular morbidity from untreated LEX is discussed in detail with the patient.      He is urged to avoid supine sleep, weight gain and alcoholic beverages since all of these can worsen LEX. He is cautioned against drowsy driving. If He feels sleepy while driving, He must pull over for a break/nap, rather than persist on the road, in the interest of He own safety and that of others on the road.   Plan   - The best tolerated pressure was BiPAP at 11/7 cm with eson mask,    - F/u in 6 weeks to assess the efficiacy of recommended pressure    - compliance download was reviewed and discussed with the pt   - compliance was reinforced     2. . Regarding treatment of other past medical problems and general health maintenance,  He is urged to follow up with PCP.

## 2018-04-24 NOTE — PATIENT INSTRUCTIONS
" Once you receive your new PAP machine from the [employer] company you're referred to, we must see you back for an office visit between 30-90 days of you using the machine to review compliance.  If you were ordered an ASV machine, we need to see you in office no sooner than your 60th day on therapy.      This is a very important time frame for insurance purposes. If you do not follow up with our office for compliance your insurance may not continue to pay for the machine. Also if you do not use the machine for at least 4 hours each night, you may be deemed \"Incompliant\", in which case the insurance may also not continue to pay for the machine.      If you are incompliant, you may have to surrender your machine to the [email or web address] company and start the process over if you wish to continue therapy after that. Meaning a new office consult and new sleep studies.     For your first visit back with our office, please bring the whole machine with the power cord. We will download the compliance off the SD card in the machine, and are able to make changes if need be in office. Some machines have a modem and we can access the data wirelessly, if this is the case please make sure the DME company grants us access to your machine.  For all follow up appointments after that, you will only need to bring the SD card to the appointment.     If you are having any issues with the mask, you have a 30 day window to exchange and try something else with your DME company.  If you are having issues with the pressure, please call our office at [phone].  These issues can cause you to not be able to use machine appropriately, there for make you incompliant.    Please call our office if you have any questions.    Thank you, [facility].  [phone]    "

## 2018-04-27 ENCOUNTER — OFFICE VISIT (OUTPATIENT)
Dept: MEDICAL GROUP | Facility: MEDICAL CENTER | Age: 28
End: 2018-04-27
Payer: COMMERCIAL

## 2018-04-27 VITALS
HEART RATE: 100 BPM | BODY MASS INDEX: 26.56 KG/M2 | DIASTOLIC BLOOD PRESSURE: 60 MMHG | SYSTOLIC BLOOD PRESSURE: 110 MMHG | RESPIRATION RATE: 12 BRPM | TEMPERATURE: 98.2 F | OXYGEN SATURATION: 93 % | WEIGHT: 207 LBS | HEIGHT: 74 IN

## 2018-04-27 DIAGNOSIS — R20.2 PARESTHESIA: ICD-10-CM

## 2018-04-27 DIAGNOSIS — F41.1 GAD (GENERALIZED ANXIETY DISORDER): ICD-10-CM

## 2018-04-27 DIAGNOSIS — M54.6 CHRONIC RIGHT-SIDED THORACIC BACK PAIN: ICD-10-CM

## 2018-04-27 DIAGNOSIS — F33.0 MILD EPISODE OF RECURRENT MAJOR DEPRESSIVE DISORDER (HCC): ICD-10-CM

## 2018-04-27 DIAGNOSIS — G47.33 OBSTRUCTIVE SLEEP APNEA SYNDROME: ICD-10-CM

## 2018-04-27 DIAGNOSIS — G89.29 CHRONIC RIGHT-SIDED THORACIC BACK PAIN: ICD-10-CM

## 2018-04-27 PROCEDURE — 99214 OFFICE O/P EST MOD 30 MIN: CPT | Performed by: NURSE PRACTITIONER

## 2018-04-27 RX ORDER — SERTRALINE HYDROCHLORIDE 100 MG/1
200 TABLET, FILM COATED ORAL
Qty: 60 TAB | Refills: 11 | Status: SHIPPED | OUTPATIENT
Start: 2018-04-27

## 2018-04-27 ASSESSMENT — PATIENT HEALTH QUESTIONNAIRE - PHQ9: CLINICAL INTERPRETATION OF PHQ2 SCORE: 0

## 2018-04-27 NOTE — LETTER
April 27, 2018         Patient: Rene Figueroa   YOB: 1990   Date of Visit: 4/27/2018           To Whom it May Concern:    Rene Figueroa was seen in my clinic on 4/27/2018. He may return to work on 4/27/18 following his appointment.    If you have any questions or concerns, please don't hesitate to call.        Sincerely,           PETR Ashton.  Electronically Signed

## 2018-04-27 NOTE — LETTER
April 27, 2018         Patient: Rene Figueroa   YOB: 1990   Date of Visit: 4/27/2018           To Whom it May Concern:    Rene Figueroa was seen in my clinic on 4/27/2018. He may return to work on 4/28/18..    If you have any questions or concerns, please don't hesitate to call.        Sincerely,           PTER Ashton.  Electronically Signed

## 2018-04-27 NOTE — ASSESSMENT & PLAN NOTE
Long standing problem, previously treated with trigger point injection which did not seem to help.  Prior xray showing mild scoliosis but no other abnormality  This he was referred for physical therapy, attended 3 sessions and is now doing exercises independently at home. He's not noticed much benefit  He is most concerned with in Area of numbness/ tingling in the R mid back, worse with certain positions. When he initially noticed this about 6 months ago it was felt the size of a golf ball, he now states it's about the size of the palm hand. He has no numbness, tingling, pain in the extremities, no change in bowel or bladder function, no lower extremity weakness

## 2018-04-27 NOTE — ASSESSMENT & PLAN NOTE
Sleep study showed AHI of 25.2, he has been seen by pulmonology and is now waiting for CPAP equipment

## 2018-04-27 NOTE — PROGRESS NOTES
"Subjective:     Chief Complaint   Patient presents with   • Anxiety     Rene Figueroa is a 27 y.o. male here today to follow up on:    LULA (generalized anxiety disorder)  Overall anxiety has improved with increase of Zoloft, now on 200 mg daily. He is not feeling as anxious or worried, not as easily angered or irritable.  Denies side effects from medication including GI upset. No recent panic attack    Sleep apnea  Sleep study showed AHI of 25.2, he has been seen by pulmonology and is now waiting for CPAP equipment    Mild episode of recurrent major depressive disorder (CMS-HCC)  Stable at this point on zoloft    Chronic right-sided thoracic back pain  Long standing problem, previously treated with trigger point injection which did not seem to help.  Prior xray showing mild scoliosis but no other abnormality  This he was referred for physical therapy, attended 3 sessions and is now doing exercises independently at home. He's not noticed much benefit  He is most concerned with in Area of numbness/ tingling in the R mid back, worse with certain positions. When he initially noticed this about 6 months ago it was felt the size of a golf ball, he now states it's about the size of the palm hand. He has no numbness, tingling, pain in the extremities, no change in bowel or bladder function, no lower extremity weakness       Current medicines (including changes today)  Current Outpatient Prescriptions   Medication Sig Dispense Refill   • sertraline (ZOLOFT) 100 MG Tab Take 2 Tabs by mouth at bedtime as needed. 60 Tab 11   • fluticasone (FLONASE) 50 MCG/ACT nasal spray 1 time daily as needed.       No current facility-administered medications for this visit.      He  has a past medical history of Acne and Anxiety.    ROS included above     Objective:     Blood pressure 110/60, pulse 100, temperature 36.8 °C (98.2 °F), resp. rate 12, height 1.88 m (6' 2\"), weight 93.9 kg (207 lb), SpO2 93 %. Body mass index is 26.58 " kg/m².     Physical Exam:  General: Alert, oriented in no acute distress.  Eye contact is good, speech is normal, affect calm  Lungs: clear to auscultation bilaterally, normal effort, no wheeze/ rhonchi/ rales.  CV: regular rate and rhythm, S1, S2, no murmur  Abdomen: soft, nontender  MS: No point tenderness over the thoracic spine, mild tenderness in the right musculature without hypertonicity. Strength is 5 out of 5 throughout  Ext: no edema, color normal, vascularity normal, temperature normal    Assessment and Plan:   The following treatment plan was discussed   1. LULA (generalized anxiety disorder)  Improved with medication increased, continue Zoloft 200 mg daily. Advised follow-up for further concerns. Counseling suggested  sertraline (ZOLOFT) 100 MG Tab   2. Mild episode of recurrent major depressive disorder (CMS-HCC)  Same as above    3. Obstructive sleep apnea syndrome  Awaiting CPAP equipment    4. Chronic right-sided thoracic back pain  Long-standing problem, x-ray showing mild scoliosis with no other abnormality. Does have persistent area of numbness/tingling in the right mid back. Is interested in seeing a spine specialist, referral placed    5. Paresthesia  REFERRAL TO PAIN CLINIC       Followup: 6 months, sooner as needed         Please note that this dictation was created using voice recognition software. I have worked with consultants from the vendor as well as technical experts from [employer] to optimize the interface. I have made every reasonable attempt to correct obvious errors, but I expect that there are errors of grammar and possibly content that I did not discover before finalizing the note.

## 2018-04-27 NOTE — ASSESSMENT & PLAN NOTE
Overall anxiety has improved with increase of Zoloft, now on 200 mg daily. He is not feeling as anxious or worried, not as easily angered or irritable.  Denies side effects from medication including GI upset. No recent panic attack

## 2018-05-02 ENCOUNTER — PATIENT MESSAGE (OUTPATIENT)
Dept: SLEEP MEDICINE | Facility: MEDICAL CENTER | Age: 28
End: 2018-05-02

## 2018-05-04 ENCOUNTER — PATIENT MESSAGE (OUTPATIENT)
Dept: MEDICAL GROUP | Facility: MEDICAL CENTER | Age: 28
End: 2018-05-04

## 2018-05-04 NOTE — TELEPHONE ENCOUNTER
From: Rene Figueroa  To: PATRICIO Ashton  Sent: 5/4/2018 10:50 AM PDT  Subject: Non-Urgent Medical Question    Hello,    I am concerned about how much weight I have lost since initiating care with [facility]. It is unexpected, should I continue to be concerned about how fast I am losing weight even though I am not working out any extra or eating less.  Thanks,  -William

## 2018-05-30 ENCOUNTER — PATIENT MESSAGE (OUTPATIENT)
Dept: SLEEP MEDICINE | Facility: MEDICAL CENTER | Age: 28
End: 2018-05-30

## 2018-05-30 NOTE — TELEPHONE ENCOUNTER
Intake ID [id number] [facility]    Everything forwarded to [employer] with approval    Emailed [employer] to verify they have received everything

## 2019-12-20 NOTE — ASSESSMENT & PLAN NOTE
For the past 5 years wife has noticed he stops breathing at night for a few seconds then will gasp and then start breathing normally again. Dad has sleep apnea. Patient notices he gets very tired daily starting about 11am. Would like to be checked for sleep apnea   Patient had labetolol 200mg due at 6am, but the parameter is to hold the medication for systolic BP below 120.

## 2025-01-23 ENCOUNTER — OFFICE VISIT (OUTPATIENT)
Dept: MEDICAL GROUP | Facility: MEDICAL CENTER | Age: 35
End: 2025-01-23
Payer: COMMERCIAL

## 2025-01-23 VITALS
DIASTOLIC BLOOD PRESSURE: 70 MMHG | SYSTOLIC BLOOD PRESSURE: 120 MMHG | BODY MASS INDEX: 32.82 KG/M2 | OXYGEN SATURATION: 98 % | TEMPERATURE: 98.4 F | WEIGHT: 255.73 LBS | HEIGHT: 74 IN | HEART RATE: 101 BPM

## 2025-01-23 DIAGNOSIS — F90.8 OTHER SPECIFIED ATTENTION DEFICIT HYPERACTIVITY DISORDER (ADHD): ICD-10-CM

## 2025-01-23 DIAGNOSIS — Z23 NEED FOR VACCINATION: ICD-10-CM

## 2025-01-23 DIAGNOSIS — Z11.59 NEED FOR HEPATITIS C SCREENING TEST: ICD-10-CM

## 2025-01-23 DIAGNOSIS — Z11.4 SCREENING FOR HIV (HUMAN IMMUNODEFICIENCY VIRUS): ICD-10-CM

## 2025-01-23 DIAGNOSIS — R00.0 TACHYCARDIA: ICD-10-CM

## 2025-01-23 DIAGNOSIS — F41.9 ANXIETY: ICD-10-CM

## 2025-01-23 DIAGNOSIS — Z56.6 STRESS AT WORK: ICD-10-CM

## 2025-01-23 DIAGNOSIS — G47.30 SLEEP APNEA, UNSPECIFIED TYPE: ICD-10-CM

## 2025-01-23 DIAGNOSIS — Z00.00 PE (PHYSICAL EXAM), ANNUAL: ICD-10-CM

## 2025-01-23 DIAGNOSIS — Z76.89 ENCOUNTER TO ESTABLISH CARE: ICD-10-CM

## 2025-01-23 PROCEDURE — 90472 IMMUNIZATION ADMIN EACH ADD: CPT

## 2025-01-23 PROCEDURE — 3078F DIAST BP <80 MM HG: CPT | Performed by: NURSE PRACTITIONER

## 2025-01-23 PROCEDURE — 90471 IMMUNIZATION ADMIN: CPT

## 2025-01-23 PROCEDURE — 90656 IIV3 VACC NO PRSV 0.5 ML IM: CPT

## 2025-01-23 PROCEDURE — 90715 TDAP VACCINE 7 YRS/> IM: CPT

## 2025-01-23 PROCEDURE — 3074F SYST BP LT 130 MM HG: CPT | Performed by: NURSE PRACTITIONER

## 2025-01-23 PROCEDURE — 99204 OFFICE O/P NEW MOD 45 MIN: CPT | Mod: 25 | Performed by: NURSE PRACTITIONER

## 2025-01-23 RX ORDER — PROPRANOLOL HYDROCHLORIDE 60 MG/1
60 CAPSULE, EXTENDED RELEASE ORAL DAILY
Qty: 30 CAPSULE | Refills: 11 | Status: SHIPPED | OUTPATIENT
Start: 2025-01-23

## 2025-01-23 SDOH — HEALTH STABILITY - MENTAL HEALTH: OTHER PHYSICAL AND MENTAL STRAIN RELATED TO WORK: Z56.6

## 2025-01-23 ASSESSMENT — PATIENT HEALTH QUESTIONNAIRE - PHQ9
9. THOUGHTS THAT YOU WOULD BE BETTER OFF DEAD, OR OF HURTING YOURSELF: NOT AT ALL
2. FEELING DOWN, DEPRESSED, IRRITABLE, OR HOPELESS: NOT AT ALL
SUM OF ALL RESPONSES TO PHQ9 QUESTIONS 1 AND 2: 0
7. TROUBLE CONCENTRATING ON THINGS, SUCH AS READING THE NEWSPAPER OR WATCHING TELEVISION: SEVERAL DAYS
1. LITTLE INTEREST OR PLEASURE IN DOING THINGS: NOT AT ALL
6. FEELING BAD ABOUT YOURSELF - OR THAT YOU ARE A FAILURE OR HAVE LET YOURSELF OR YOUR FAMILY DOWN: NOT AL ALL
3. TROUBLE FALLING OR STAYING ASLEEP OR SLEEPING TOO MUCH: NOT AT ALL
4. FEELING TIRED OR HAVING LITTLE ENERGY: NOT AT ALL
SUM OF ALL RESPONSES TO PHQ QUESTIONS 1-9: 1
5. POOR APPETITE OR OVEREATING: NOT AT ALL
8. MOVING OR SPEAKING SO SLOWLY THAT OTHER PEOPLE COULD HAVE NOTICED. OR THE OPPOSITE, BEING SO FIGETY OR RESTLESS THAT YOU HAVE BEEN MOVING AROUND A LOT MORE THAN USUAL: NOT AT ALL

## 2025-01-23 ASSESSMENT — ENCOUNTER SYMPTOMS
INSOMNIA: 0
LOSS OF CONSCIOUSNESS: 0
SORE THROAT: 0
COUGH: 0
EYE REDNESS: 0
PALPITATIONS: 0
DIARRHEA: 0
BACK PAIN: 0
TREMORS: 0
SINUS PAIN: 0
POLYDIPSIA: 0
CONSTIPATION: 0
MYALGIAS: 0
VOMITING: 0
NERVOUS/ANXIOUS: 0
EYE PAIN: 0
ABDOMINAL PAIN: 0
BLOOD IN STOOL: 0
HEADACHES: 0
SPUTUM PRODUCTION: 0
SPEECH CHANGE: 0
FLANK PAIN: 0
DIZZINESS: 0
BRUISES/BLEEDS EASILY: 0
SHORTNESS OF BREATH: 0
FOCAL WEAKNESS: 0
WHEEZING: 0
NAUSEA: 0
WEAKNESS: 0
FEVER: 0
SENSORY CHANGE: 0
DEPRESSION: 0
EYE DISCHARGE: 0
CHILLS: 0
WEIGHT LOSS: 0

## 2025-02-24 DIAGNOSIS — Z56.6 STRESS AT WORK: ICD-10-CM

## 2025-02-24 SDOH — HEALTH STABILITY - MENTAL HEALTH: OTHER PHYSICAL AND MENTAL STRAIN RELATED TO WORK: Z56.6

## 2025-02-25 RX ORDER — PROPRANOLOL HYDROCHLORIDE 60 MG/1
60 CAPSULE, EXTENDED RELEASE ORAL DAILY
Qty: 30 CAPSULE | Refills: 0 | Status: SHIPPED | OUTPATIENT
Start: 2025-02-25

## 2025-02-25 NOTE — TELEPHONE ENCOUNTER
Received request via: Pharmacy    Was the patient seen in the last year in this department? Yes    Does the patient have an active prescription (recently filled or refills available) for medication(s) requested? No    [facility] Name[email or web address]    Does the patient have USP Plus and need 100-day supply? (This applies to ALL medications) Patient does not have SCP
